# Patient Record
Sex: MALE | Employment: OTHER | URBAN - METROPOLITAN AREA
[De-identification: names, ages, dates, MRNs, and addresses within clinical notes are randomized per-mention and may not be internally consistent; named-entity substitution may affect disease eponyms.]

---

## 2019-09-04 ENCOUNTER — TELEPHONE (OUTPATIENT)
Dept: NEUROLOGY | Age: 69
End: 2019-09-04

## 2019-09-04 ENCOUNTER — OFFICE VISIT (OUTPATIENT)
Dept: NEUROLOGY | Age: 69
End: 2019-09-04

## 2019-09-04 VITALS
DIASTOLIC BLOOD PRESSURE: 80 MMHG | HEART RATE: 65 BPM | RESPIRATION RATE: 20 BRPM | BODY MASS INDEX: 28.3 KG/M2 | HEIGHT: 73 IN | OXYGEN SATURATION: 98 % | WEIGHT: 213.5 LBS | SYSTOLIC BLOOD PRESSURE: 144 MMHG

## 2019-09-04 DIAGNOSIS — G62.9 NEUROPATHY: ICD-10-CM

## 2019-09-04 DIAGNOSIS — G62.9 NEUROPATHY: Primary | ICD-10-CM

## 2019-09-04 DIAGNOSIS — G45.9 TIA (TRANSIENT ISCHEMIC ATTACK): ICD-10-CM

## 2019-09-04 NOTE — PROGRESS NOTES
This was an elective carotid Doppler for evaluation of possible TIA expression. B-mode or real-time imaging disclosed homogeneous plaquing of minimal nature in both the right and left ICA CCA distributions. Color-flow making subtle flow alterations regarding same. Doppler and spectral analysis otherwise unrevealing. No significant plaquing within the realms of the right or left ECA territories. Vertebral artery flow antegrade bilaterally. Impression: This is a minimally abnormal carotid Doppler indicating very subtle plaquing within the confines of the right and left ICA/CCA distributions as submitted. This does not appear to be of hemodynamic significance. Clinical correlation is advised.   FRANCHESCA TINOCO.

## 2019-09-04 NOTE — LETTER
9/4/19 Patient: Kelley Gallardo YOB: 1950 Date of Visit: 9/4/2019 Christa Pires MD 
[address] [address] VIA Facsimile: [phone] Dear Christa Pires MD, Thank you for referring Mr. Kelley Gallardo to [facility] for evaluation. My notes for this consultation are attached. If you have questions, please do not hesitate to call me. I look forward to following your patient along with you.  
 
 
Sincerely, 
 
Melissa Lemons MD

## 2019-09-04 NOTE — PATIENT INSTRUCTIONS
Learning About Chase Solorzano  What is a living will? A living will is a legal form you use to write down the kind of care you want at the end of your life. It is used by the health professionals who will treat you if you aren't able to decide for yourself. If you put your wishes in writing, your loved ones and others will know what kind of care you want. They won't need to guess. This can ease your mind and be helpful to others. A living will is not the same as an estate or property will. An estate will explains what you want to happen with your money and property after you die. Is a living will a legal document? A living will is a legal document. Each state has its own laws about living sadler. If you move to another state, make sure that your living will is legal in the state where you now live. Or you might use a universal form that has been approved by many states. This kind of form can sometimes be completed and stored online. Your electronic copy will then be available wherever you have a connection to the Internet. In most cases, doctors will respect your wishes even if you have a form from a different state. · You don't need an  to complete a living will. But legal advice can be helpful if your state's laws are unclear, your health history is complicated, or your family can't agree on what should be in your living will. · You can change your living will at any time. Some people find that their wishes about end-of-life care change as their health changes. · In addition to making a living will, think about completing a medical power of  form. This form lets you name the person you want to make end-of-life treatment decisions for you (your \"health care agent\") if you're not able to. Many hospitals and nursing homes will give you the forms you need to complete a living will and a medical power of .   · Your living will is used only if you can't make or communicate decisions for yourself anymore. If you become able to make decisions again, you can accept or refuse any treatment, no matter what you wrote in your living will. · Your state may offer an online registry. This is a place where you can store your living will online so the doctors and nurses who need to treat you can find it right away. What should you think about when creating a living will? Talk about your end-of-life wishes with your family members and your doctor. Let them know what you want. That way the people making decisions for you won't be surprised by your choices. Think about these questions as you make your living will:  · Do you know enough about life support methods that might be used? If not, talk to your doctor so you know what might be done if you can't breathe on your own, your heart stops, or you're unable to swallow. · What things would you still want to be able to do after you receive life-support methods? Would you want to be able to walk? To speak? To eat on your own? To live without the help of machines? · If you have a choice, where do you want to be cared for? In your home? At a hospital or nursing home? · Do you want certain Anabaptist practices performed if you become very ill? · If you have a choice at the end of your life, where would you prefer to die? At home? In a hospital or nursing home? Somewhere else? · Would you prefer to be buried or cremated? · Do you want your organs to be donated after you die? What should you do with your living will? · Make sure that your family members and your health care agent have copies of your living will. · Give your doctor a copy of your living will to keep in your medical record. If you have more than one doctor, make sure that each one has a copy. · You may want to put a copy of your living will where it can be easily found. Where can you learn more? Go to http://gonzalo-tony.info/.   Enter Q613 in the search box to learn more about \"Learning About Living Perrohank. \"  Current as of: April 1, 2019  Content Version: 12.1  © 0946-9354 [employer]. Care instructions adapted under license by [employer] (which disclaims liability or warranty for this information). If you have questions about a medical condition or this instruction, always ask your healthcare professional. [place] [address] any warranty or liability for your use of this information. Patient history reviewed and patient examined. Patient with far reaching symptoms that extend over multiple areas of concern and not simply consolidated. For that reason will suggest a variety of investigative test including lab work carotid Doppler imaging of the head with an MRI MRA and an EMG nerve conduction of the lower extremities. Further suggestions could follow.

## 2019-09-04 NOTE — PROGRESS NOTES
Neurology Consult      Subjective:      Colleen Mcleod is a 77 y.o. male Who comes in today with his spouse. Has an interesting 1 year history of transient episodes of tingling and a stereotypical distribution typically both sides of the face the mouth for tongue and both hands. There is no weakness component. No associated headache component. This happens every a.m. can last hours and can occur later in the day as well lasting less than an hour? It is enhanced by stress and diminished but really nothing. Says his special sensory function hearing smell taste and vision is okay although he is seeing a retinal specialist for left eye difficulties. I think he has had a cataract removal left eye? Also admits that he can notice some exceptional distortions of sensibility in the feet. Again no pain. No background history of diabetes no previously recognized stroke or TIA history. No background history of hypertension cholesterol  heart disease does not smoke ethanol ingestion is mild exceptionally moderate. Bowel and bladder function okay history of previous prostatectomy for cancer but no evidence of recurrence. Bulbar function intact. Current Outpatient Medications   Medication Sig Dispense Refill    buPROPion XL (WELLBUTRIN XL) 150 mg tablet Take 150 mg by mouth every morning.  therapeutic multivitamin (THERAGRAN) tablet Take 1 Tab by mouth daily.  cholecalciferol (VITAMIN D3) 1,000 unit tablet Take 1,000 Units by mouth daily.  docusate sodium (COLACE) 100 mg capsule Take 1 Cap by mouth two (2) times a day. 60 Cap 2    oxybutynin (DITROPAN) 5 mg tablet Take 1 Tab by mouth every eight (8) hours as needed for Other (Bladder Spasm). 30 Tab 1    oxyCODONE IR (ROXICODONE) 5 mg immediate release tablet Take 1 Tab by mouth every four (4) hours as needed for Pain.  Max Daily Amount: 30 mg. 30 Tab 0    dextroamphetamine-amphetamine (ADDERALL) 20 mg tablet Take 20 mg by mouth two (2) times a day.      omega-3 fatty acids-vitamin e (FISH OIL) 1,000 mg cap Take 1 Cap by mouth daily. Allergies   Allergen Reactions    Celebrex [Celecoxib] Nausea Only    Sulfa (Sulfonamide Antibiotics) Swelling     Skin redness with oral sulfa  Topical sulfa causes swelling at the site     Past Medical History:   Diagnosis Date    Arthritis     hands    Cancer ([facility] [address]     prostate cancer    Depression     History of chicken pox     History of measles     History of rheumatic fever as a child     Ill-defined condition 1-    diviticulitis- treated with antibiotics- pain currently resolved    Ill-defined condition     hypoglycemic    Ill-defined condition     vitamin d deficiency    Ill-defined condition     Leiden Factor V- no hx of blood clots    Psychiatric disorder 2004    depression    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    Snoring       Past Surgical History:   Procedure Laterality Date    HX APPENDECTOMY      HX HERNIA REPAIR      HX ORTHOPAEDIC  2007    right knee replacement    HX ORTHOPAEDIC  2001    left knee surgery    HX OTHER SURGICAL  1998    bilateral hernia repair    HX PROSTATECTOMY  2013    HX SHOULDER ARTHROSCOPY      HX TONSILLECTOMY        Social History     Socioeconomic History    Marital status: UNKNOWN     Spouse name: Not on file    Number of children: Not on file    Years of education: Not on file    Highest education level: Not on file   Occupational History    Not on file   Social Needs    Financial resource strain: Not on file    Food insecurity:     Worry: Not on file     Inability: Not on file    Transportation needs:     Medical: Not on file     Non-medical: Not on file   Tobacco Use    Smoking status: Never Smoker    Smokeless tobacco: Never Used   Substance and Sexual Activity    Alcohol use:  Yes     Alcohol/week: 11.0 standard drinks     Types: 5 Glasses of wine, 6 Cans of beer per week    Drug use: No    Sexual activity: Yes Partners: Female     Birth control/protection: None   Lifestyle    Physical activity:     Days per week: Not on file     Minutes per session: Not on file    Stress: Not on file   Relationships    Social connections:     Talks on phone: Not on file     Gets together: Not on file     Attends Roman Catholic service: Not on file     Active member of club or organization: Not on file     Attends meetings of clubs or organizations: Not on file     Relationship status: Not on file    Intimate partner violence:     Fear of current or ex partner: Not on file     Emotionally abused: Not on file     Physically abused: Not on file     Forced sexual activity: Not on file   Other Topics Concern    Not on file   Social History Narrative    Not on file      Family History   Problem Relation Age of Onset    Cancer Father         prostate and esophageal cancer    Lung Disease Mother     Diabetes Maternal Grandfather     Cancer Sister         breast    Headache Sister       Visit Vitals  /80   Pulse 65   Resp 20   Ht 6' 1\" (1.854 m)   Wt 96.8 kg (213 lb 8 oz)   SpO2 98%   BMI 28.17 kg/m²        Review of Systems:   A comprehensive review of systems was negative except for that written in the HPI. Neuro Exam:     Appearance: The patient is well developed, well nourished, provides a coherent history and is in no acute distress. Mental Status: Oriented to time, place and person. Mood and affect appropriate. Cranial Nerves:   Intact visual fields. Fundi are benign. ALTHEA, EOM's full, no nystagmus, no ptosis. Facial sensation is normal. Corneal reflexes are intact. Facial movement is symmetric. Hearing is normal bilaterally. Palate is midline with normal sternocleidomastoid and trapezius muscles are normal. Tongue is midline. Motor:  5/5 strength in upper and lower proximal and distal muscles. Normal bulk and tone. No fasciculations.    Reflexes:   Deep tendon reflexes 2+/4 and symmetrical.   Sensory:    Diminished distally to touch, pinprick and vibration. DSS ok and position ok. Gait:  Normal gait. Tremor:   No tremor noted. Cerebellar:  No cerebellar signs present. Neurovascular:  Normal heart sounds and regular rhythm, peripheral pulses intact, and no carotid bruits. Toes downgoing no clonus no Veronica's. Lhermitte's negative. Straight leg raising -90 degrees. Tinel's over both wrist ulnar grooves negative Adson's maneuver negative Phalen's negative. .            Assessment:   Problem 1 TIA with special consideration for VBI. Interesting episodic bilateral sensory expressions on a daily basis and would like to work-up with carotid Doppler MRI brain and MRA of brain. We will also suggest an echocardiogram. Initial suggestion to take daily baby ASA on first encounter history etc... Problem 2 neuropathy. Patient has symptoms beyond the head and neck area and by exam findings suggestive of neuropathy on first encounter. Check labs do EMG nerve conduction both lower extremities and further suggestions may follow. Plan:   Revisit in about 6 weeks.   Signed by :  Ignacia Muhammad MD

## 2019-09-05 LAB
ALBUMIN SERPL ELPH-MCNC: 3.9 G/DL (ref 2.9–4.4)
ALBUMIN SERPL-MCNC: 4.5 G/DL (ref 3.6–4.8)
ALBUMIN/GLOB SERPL: 1.3 {RATIO} (ref 0.7–1.7)
ALBUMIN/GLOB SERPL: 1.9 {RATIO} (ref 1.2–2.2)
ALP SERPL-CCNC: 83 IU/L (ref 39–117)
ALPHA1 GLOB SERPL ELPH-MCNC: 0.2 G/DL (ref 0–0.4)
ALPHA2 GLOB SERPL ELPH-MCNC: 0.7 G/DL (ref 0.4–1)
ALT SERPL-CCNC: 20 IU/L (ref 0–44)
ANA SER QL: NEGATIVE
AST SERPL-CCNC: 20 IU/L (ref 0–40)
B-GLOBULIN SERPL ELPH-MCNC: 1.1 G/DL (ref 0.7–1.3)
BASOPHILS # BLD AUTO: 0.1 X10E3/UL (ref 0–0.2)
BASOPHILS NFR BLD AUTO: 1 %
BILIRUB SERPL-MCNC: 0.3 MG/DL (ref 0–1.2)
BUN SERPL-MCNC: 16 MG/DL (ref 8–27)
BUN/CREAT SERPL: 14 (ref 10–24)
CALCIUM SERPL-MCNC: 9.7 MG/DL (ref 8.6–10.2)
CHLORIDE SERPL-SCNC: 103 MMOL/L (ref 96–106)
CO2 SERPL-SCNC: 23 MMOL/L (ref 20–29)
CREAT SERPL-MCNC: 1.13 MG/DL (ref 0.76–1.27)
CRP SERPL-MCNC: <1 MG/L (ref 0–10)
ENA SS-A AB SER-ACNC: <0.2 AI (ref 0–0.9)
ENA SS-B AB SER-ACNC: <0.2 AI (ref 0–0.9)
EOSINOPHIL # BLD AUTO: 0.2 X10E3/UL (ref 0–0.4)
EOSINOPHIL NFR BLD AUTO: 4 %
ERYTHROCYTE [DISTWIDTH] IN BLOOD BY AUTOMATED COUNT: 12 % (ref 12.3–15.4)
ERYTHROCYTE [SEDIMENTATION RATE] IN BLOOD BY WESTERGREN METHOD: 2 MM/HR (ref 0–30)
FOLATE SERPL-MCNC: 14.7 NG/ML
GAMMA GLOB SERPL ELPH-MCNC: 1.1 G/DL (ref 0.4–1.8)
GLOBULIN SER CALC-MCNC: 2.4 G/DL (ref 1.5–4.5)
GLOBULIN SER CALC-MCNC: 3 G/DL (ref 2.2–3.9)
GLUCOSE SERPL-MCNC: 89 MG/DL (ref 65–99)
HCT VFR BLD AUTO: 45.5 % (ref 37.5–51)
HGB BLD-MCNC: 15.2 G/DL (ref 13–17.7)
IMM GRANULOCYTES # BLD AUTO: 0 X10E3/UL (ref 0–0.1)
IMM GRANULOCYTES NFR BLD AUTO: 1 %
LYMPHOCYTES # BLD AUTO: 1.9 X10E3/UL (ref 0.7–3.1)
LYMPHOCYTES NFR BLD AUTO: 29 %
M PROTEIN SERPL ELPH-MCNC: NORMAL G/DL
MCH RBC QN AUTO: 29.7 PG (ref 26.6–33)
MCHC RBC AUTO-ENTMCNC: 33.4 G/DL (ref 31.5–35.7)
MCV RBC AUTO: 89 FL (ref 79–97)
MONOCYTES # BLD AUTO: 0.6 X10E3/UL (ref 0.1–0.9)
MONOCYTES NFR BLD AUTO: 10 %
NEUTROPHILS # BLD AUTO: 3.7 X10E3/UL (ref 1.4–7)
NEUTROPHILS NFR BLD AUTO: 55 %
PLATELET # BLD AUTO: 287 X10E3/UL (ref 150–450)
PLEASE NOTE, 011150: NORMAL
POTASSIUM SERPL-SCNC: 4.5 MMOL/L (ref 3.5–5.2)
PROT SERPL-MCNC: 6.9 G/DL (ref 6–8.5)
RBC # BLD AUTO: 5.11 X10E6/UL (ref 4.14–5.8)
RHEUMATOID FACT SERPL-ACNC: <10 IU/ML (ref 0–13.9)
SODIUM SERPL-SCNC: 139 MMOL/L (ref 134–144)
TSH SERPL DL<=0.005 MIU/L-ACNC: 4.08 UIU/ML (ref 0.45–4.5)
VIT B12 SERPL-MCNC: 448 PG/ML (ref 232–1245)
WBC # BLD AUTO: 6.6 X10E3/UL (ref 3.4–10.8)

## 2019-09-06 ENCOUNTER — HOSPITAL ENCOUNTER (OUTPATIENT)
Dept: MRI IMAGING | Age: 69
Discharge: HOME | End: 2019-09-06
Attending: SPECIALIST
Payer: MEDICARE

## 2019-09-06 ENCOUNTER — HOSPITAL ENCOUNTER (OUTPATIENT)
Dept: NON INVASIVE DIAGNOSTICS | Age: 69
Discharge: HOME | End: 2019-09-06
Attending: SPECIALIST
Payer: MEDICARE

## 2019-09-06 DIAGNOSIS — G45.9 TIA (TRANSIENT ISCHEMIC ATTACK): ICD-10-CM

## 2019-09-06 LAB
ECHO AO ROOT DIAM: 3.05 CM
ECHO AV AREA PLAN: 3.9 CM2
ECHO EST RA PRESSURE: 5 MMHG
ECHO LA AREA 4C: 18.8 CM2
ECHO LA MAJOR AXIS: 3.43 CM
ECHO LA TO AORTIC ROOT RATIO: 1.12
ECHO LA VOL 4C: 51.93 ML (ref 18–58)
ECHO LV EDV A4C: 100.3 ML
ECHO LV EJECTION FRACTION A4C: 67 %
ECHO LV ESV A4C: 33.4 ML
ECHO LV INTERNAL DIMENSION DIASTOLIC: 4.18 CM (ref 4.2–5.9)
ECHO LV INTERNAL DIMENSION SYSTOLIC: 2.84 CM
ECHO LV IVSD: 1.34 CM (ref 0.6–1)
ECHO LV MASS 2D: 203.5 G (ref 88–224)
ECHO LV MASS INDEX 2D: 78.1 G/M2 (ref 49–115)
ECHO LV POSTERIOR WALL DIASTOLIC: 1.03 CM (ref 0.6–1)
ECHO LVOT DIAM: 1.7 CM
ECHO LVOT PEAK GRADIENT: 4.8 MMHG
ECHO LVOT PEAK VELOCITY: 110.05 CM/S
ECHO MV A VELOCITY: 56.72 CM/S
ECHO MV AREA PHT: 3.8 CM2
ECHO MV AREA PLAN: 8.3 CM2
ECHO MV E DECELERATION TIME (DT): 102.1 MS
ECHO MV E VELOCITY: 28.76 CM/S
ECHO MV E/A RATIO: 0.5
ECHO MV MAX VELOCITY: 68.32 CM/S
ECHO MV MEAN GRADIENT: 0.6 MMHG
ECHO MV PEAK GRADIENT: 1.9 MMHG
ECHO MV PRESSURE HALF TIME (PHT): 58.3 MS
ECHO MV VTI: 16.29 CM
ECHO PULMONARY ARTERY SYSTOLIC PRESSURE (PASP): 18.1 MMHG
ECHO PV REGURGITANT MAX VELOCITY: 84.74 CM/S
ECHO RA AREA 4C: 10.86 CM2
ECHO RIGHT VENTRICULAR SYSTOLIC PRESSURE (RVSP): 18.1 MMHG
ECHO TV REGURGITANT MAX VELOCITY: 181.02 CM/S
ECHO TV REGURGITANT PEAK GRADIENT: 13.1 MMHG

## 2019-09-06 PROCEDURE — 70551 MRI BRAIN STEM W/O DYE: CPT

## 2019-09-06 PROCEDURE — 70544 MR ANGIOGRAPHY HEAD W/O DYE: CPT

## 2019-09-06 PROCEDURE — 93306 TTE W/DOPPLER COMPLETE: CPT

## 2019-09-18 DIAGNOSIS — G62.9 NEUROPATHY: ICD-10-CM

## 2019-10-01 ENCOUNTER — OFFICE VISIT (OUTPATIENT)
Dept: NEUROLOGY | Age: 69
End: 2019-10-01

## 2019-10-01 DIAGNOSIS — G62.9 PERIPHERAL NERVE DISORDER: Primary | ICD-10-CM

## 2019-10-01 NOTE — PROGRESS NOTES
This was an elective EMG nerve conduction of both lower extremities. Concerns went to an expressed peripheral neuropathy. Patient history. Patient had several chief complaints on first encounter 1 of which was altered sensation in the feet. Recently had labs and that was a mild deficiency and vitamin B12. Currently taking 500 mcg B12 supplements. No history of diabetes. No connective tissue disease history. Patient exam.  Alert cooperative articulate and appropriate. Cranial nerves II through XII normal.  Cerebellar testing finger-nose-finger toe to finger intact. Motor 5/5. Sensory reveals slightly diminished appreciation to touch and temperature etc. in the legs. Gait and transfers normal.    EMG nerve conduction findings. 1.  Needle insertion and probing was uniformly normal.  No evidence of acute denervation or chronic denervation/reinnervation or myopathic potentials. Motor unit recruitment as to number morphology and time sequencing all appropriate. Patient tolerated this without difficulties. 2.  The nerve conduction portion suggest unobtainable right superior peroneal sensory and both left and right sural sensory's. There was only a subtle delay in the right left tibial F-wave latencies. H reflex is normal.    Impression: This study suggests at first look, a sensory neuropathy with results as noted. I do not see a simple explanation based on patient's age, as he is very physically fit and otherwise attentive to his health needs.   FRANCHESCA TINOCO.

## 2019-10-01 NOTE — PROGRESS NOTES
EMG/ NCS Report  [facility]  [address] [facility], [address] [place] [address] [address]   Ph: [phone]   FAX: [phone]/ [phone]  Test Date:  10/1/2019    Patient: Ana Hairston : 1950 Physician: Ascencion Quintana MD   Sex: Male Height: ' \" Ref Phys: Reinier Silva IV, MD   ID#: [id number] Weight:  lbs. Technician: [facility]     Patient History / Exam:  CC:PERIPHERIAL NEUROPATHY          EMG & NCV Findings:  Evaluation of the left Fibular motor and the right Fibular motor nerves showed normal distal onset latency (L6.4, R4.5 ms), normal amplitude (L2.9, R6.2 mV), normal conduction velocity (B Fib-Ankle, L46, R42 m/s), and normal conduction velocity (Poplt-B Fib, L45, R43 m/s). The left tibial motor and the right tibial motor nerves showed normal distal onset latency (L4.1, R4.7 ms), normal amplitude (L4.9, R6.5 mV), and normal conduction velocity (Knee-Ankle, L41, R41 m/s). The left Sup Fibular sensory nerve showed normal distal peak latency (2.7 ms), normal amplitude (8.4 µV), and normal conduction velocity (Lower leg-Lat ankle, 48 m/s). The right Sup Fibular sensory nerve showed no response (Lower leg). The left sural sensory and the right sural sensory nerves showed no response (Calf). F Wave studies indicate that the left tibial F wave has prolonged latency (57.82 ms). The right tibial F wave has prolonged latency (57.82 ms). All F Wave left vs. right side latency differences were within normal limits. All H Reflex left vs. right side latency differences were within normal limits. All examined muscles (as indicated in the following table) showed no evidence of electrical instability.         Impression:        ___________________________  Reinier Silva IV, MD      Nerve Conduction Studies  Anti Sensory Summary Table     Stim Site NR Peak (ms) Norm Peak (ms) P-T Amp (µV) Norm P-T Amp Site1 Site2 Dist (cm)   Left Sup Fibular Anti Sensory (Lat ankle)  28.1°C   Lower leg    2.7 <4.6 8.4 >4 Lower leg Lat ankle 10.0   Site 2    2.6  7.0       Right Sup Fibular Anti Sensory (Lat ankle)  30.1°C   Lower leg NR  <4.6  >4 Lower leg Lat ankle 10.0   Left Sural Anti Sensory (Lat Mall)  28.6°C   Calf NR  <4.5  >4.0 Calf Lat Mall 14.0   Right Sural Anti Sensory (Lat Mall)  30.5°C   Calf NR  <4.5  >4.0 Calf Lat Mall 14.0     Motor Summary Table     Stim Site NR Onset (ms) Norm Onset (ms) O-P Amp (mV) Norm O-P Amp Amp (Prev) (%) Site1 Site2 Dist (cm) Mohinder (m/s) Norm Mohinder (m/s)   Left Fibular Motor (Ext Dig Brev)  29.8°C   Ankle    6.4 <6.5 2.9 >1.1 100.0 Ankle Ext Dig Brev 8.0     B Fib    13.4  2.5  86.2 B Fib Ankle 32.0 46 >38   Poplt    15.6  2.3  92.0 Poplt B Fib 10.0 45 >42   Right Fibular Motor (Ext Dig Brev)  29.7°C   Ankle    4.5 <6.5 6.2 >1.1 100.0 Ankle Ext Dig Brev 8.0     B Fib    12.3  4.8  77.4 B Fib Ankle 33.0 42 >38   Poplt    14.6  4.5  93.8 Poplt B Fib 10.0 43 >42   Left Tibial Motor (Abd Flores Brev)  28.7°C   Ankle    4.1 <6.1 4.9 >1.1 100.0 Ankle Abd Flores Brev 8.0     Knee    13.3  3.9  79.6 Knee Ankle 38.0 41 >39   Right Tibial Motor (Abd Flores Brev)  30.4°C   Ankle    4.7 <6.1 6.5 >1.1 100.0 Ankle Abd Flores Brev 8.0     Knee    14.3  5.3  81.5 Knee Ankle 39.0 41 >39     F Wave Studies     NR F-Lat (ms) Lat Norm (ms) L-R F-Lat (ms) L-R Lat Norm   Left Tibial (Mrkrs) (Abd Hallucis)  28.4°C      57.82 <56 0.00 <5.7   Right Tibial (Mrkrs) (Abd Hallucis)  29.9°C      57.82 <56 0.00 <5.7     H Reflex Studies     NR H-Lat (ms) L-R H-Lat (ms) L-R Lat Norm   Left Tibial (Gastroc)  28.3°C      35.02 0.00 <2.0   Right Tibial (Gastroc)  29.8°C      35.02 0.00 <2.0     EMG     Side Muscle Nerve Root Ins Act Fibs Psw Recrt Duration Amp Poly Comment   Right Ext Dig Brev Dp Br Peron L5, S1 Nml Nml Nml Nml Nml Nml Nml    Right AntTibialis Dp Br Peron L4-5 Nml Nml Nml Nml Nml Nml Nml    Right MedGastroc Tibial S1-2 Nml Nml Nml Nml Nml Nml Nml    Right VastusMed Femoral L2-4 Nml Nml Nml Nml Nml Nml Nml    Right BicepsFemL Sciatic L5-S2 Nml Nml Nml Nml Nml Nml Nml    Left Ext Dig Brev Dp Br Peron L5, S1 Nml Nml Nml Nml Nml Nml Nml    Left AntTibialis Dp Br Peron L4-5 Nml Nml Nml Nml Nml Nml Nml    Left MedGastroc Tibial S1-2 Nml Nml Nml Nml Nml Nml Nml    Left VastusMed Femoral L2-4 Nml Nml Nml Nml Nml Nml Nml    Left BicepsFemL Sciatic L5-S2 Nml Nml Nml Nml Nml Nml Nml                Nerve Conduction Studies  Anti Sensory Left/Right Comparison     Stim Site L Lat (ms) R Lat (ms) L-R Lat (ms) L Amp (µV) R Amp (µV) L-R Amp (%) Site1 Site2 L Mohinder (m/s) R Mohinder (m/s) L-R Mohinder (m/s)   Sup Fibular Anti Sensory (Lat ankle)  28.1°C   Lower leg 2.1   8.4   Lower leg Lat ankle 48     Site 2 2.2   7.0          Sural Anti Sensory (Lat Mall)  28.6°C   Calf       Calf Lat Mall        Motor Left/Right Comparison     Stim Site L Lat (ms) R Lat (ms) L-R Lat (ms) L Amp (mV) R Amp (mV) L-R Amp (%) Site1 Site2 L Mohinder (m/s) R Mohinder (m/s) L-R Mohinder (m/s)   Fibular Motor (Ext Dig Brev)  29.8°C   Ankle 6.4 4.5 1.9 2.9 6.2 53.2 Ankle Ext Dig Brev      B Fib 13.4 12.3 1.1 2.5 4.8 47.9 B Fib Ankle 46 42 4   Poplt 15.6 14.6 1.0 2.3 4.5 48.9 Poplt B Fib 45 43 2   Tibial Motor (Abd Folres Brev)  28.7°C   Ankle 4.1 4.7 0.6 4.9 6.5 24.6 Ankle Abd Flores Brev      Knee 13.3 14.3 1.0 3.9 5.3 26.4 Knee Ankle 41 41 0         Waveforms:

## 2019-10-03 ENCOUNTER — APPOINTMENT (OUTPATIENT)
Dept: GENERAL RADIOLOGY | Age: 69
End: 2019-10-03
Attending: PHYSICIAN ASSISTANT
Payer: MEDICARE

## 2019-10-03 ENCOUNTER — APPOINTMENT (OUTPATIENT)
Dept: VASCULAR SURGERY | Age: 69
End: 2019-10-03
Attending: PHYSICIAN ASSISTANT
Payer: MEDICARE

## 2019-10-03 ENCOUNTER — HOSPITAL ENCOUNTER (EMERGENCY)
Age: 69
Discharge: HOME | End: 2019-10-03
Attending: EMERGENCY MEDICINE
Payer: MEDICARE

## 2019-10-03 VITALS
OXYGEN SATURATION: 98 % | SYSTOLIC BLOOD PRESSURE: 159 MMHG | HEIGHT: 73 IN | BODY MASS INDEX: 27.17 KG/M2 | TEMPERATURE: 98.5 F | WEIGHT: 205 LBS | HEART RATE: 87 BPM | RESPIRATION RATE: 16 BRPM | DIASTOLIC BLOOD PRESSURE: 94 MMHG

## 2019-10-03 DIAGNOSIS — D68.51 FACTOR V LEIDEN MUTATION (HCC): ICD-10-CM

## 2019-10-03 DIAGNOSIS — M54.41 ACUTE BILATERAL LOW BACK PAIN WITH RIGHT-SIDED SCIATICA: Primary | ICD-10-CM

## 2019-10-03 DIAGNOSIS — M79.604 RIGHT LEG PAIN: ICD-10-CM

## 2019-10-03 PROCEDURE — 72100 X-RAY EXAM L-S SPINE 2/3 VWS: CPT

## 2019-10-03 PROCEDURE — 93971 EXTREMITY STUDY: CPT

## 2019-10-03 PROCEDURE — 74011250637 HC RX REV CODE- 250/637: Performed by: PHYSICIAN ASSISTANT

## 2019-10-03 PROCEDURE — 99283 EMERGENCY DEPT VISIT LOW MDM: CPT

## 2019-10-03 RX ORDER — METHOCARBAMOL 750 MG/1
750 TABLET, FILM COATED ORAL 4 TIMES DAILY
Qty: 20 TAB | Refills: 0 | Status: SHIPPED | OUTPATIENT
Start: 2019-10-03 | End: 2019-10-03

## 2019-10-03 RX ORDER — LIDOCAINE 4 G/100G
PATCH TOPICAL
Qty: 30 PATCH | Refills: 0 | Status: SHIPPED | OUTPATIENT
Start: 2019-10-03

## 2019-10-03 RX ORDER — NAPROXEN 250 MG/1
500 TABLET ORAL
Status: COMPLETED | OUTPATIENT
Start: 2019-10-03 | End: 2019-10-03

## 2019-10-03 RX ORDER — PREDNISONE 20 MG/1
60 TABLET ORAL DAILY
Qty: 15 TAB | Refills: 0 | Status: SHIPPED | OUTPATIENT
Start: 2019-10-03 | End: 2019-10-08

## 2019-10-03 RX ORDER — METHOCARBAMOL 750 MG/1
750 TABLET, FILM COATED ORAL 4 TIMES DAILY
Qty: 20 TAB | Refills: 0 | Status: SHIPPED | OUTPATIENT
Start: 2019-10-03 | End: 2021-05-11

## 2019-10-03 RX ADMIN — NAPROXEN 500 MG: 250 TABLET ORAL at 12:04

## 2019-10-03 NOTE — ED TRIAGE NOTES
Pt having soreness in left leg after traveling to and from [facility]. Pain in right leg. Pt sent here for US for blood clot.

## 2019-10-03 NOTE — ED PROVIDER NOTES
77 y.o. Male with PMHx significant for Factor V Leiden disorder, arthritis s/p right knee replacement, transient peripheral neuropathy, presents with chief complaint of right leg pain. Patient states that he recently got back from [place] (10+ hour plane ride) approximately one week ago. Several days after returning to the [facility] he felt the onset of pain in the right leg. Describes the discomfort as a \"soreness\" in the right lower extremity that extends all the way down the right lower extremity into the right foot. The pain progressively worsens as he goes about his day, and he rates his current level of discomfort in the ED as a 2/10 in severity. Patient additionally complains of discomfort in the bilateral buttocks, which started while he was in [facility]. There is some tingling in the right foot, but he denies loss of sensation or numbness. Patient has been taking Naproxen with some temporary relief to his discomfort, but he denies taking any Naproxen today. Patient denies history of spinal issues in the past, and he specifically denies nausea, vomiting, diarrhea, diaphoresis, abdominal pain, changes in bowel or bladder function. Of note, patient states that he has a history of right ankle sprain about five years ago and has had some residual swelling in the RLE since then. Denies any increase in swelling from baseline. There are no other acute medical concerns at this time. Social hx: Denies Tobacco use; Positive EtOH use (occasional); Denies Illicit Drug Abuse    PCP: Wei Talley MD     Note written by Jose A De La Torre, as dictated by EDITH Bridges 10:57 AM    The history is provided by the patient. No  was used.         Past Medical History:   Diagnosis Date    Arthritis     hands    Cancer [facility])     prostate cancer    Depression     History of chicken pox     History of measles     History of rheumatic fever as a child     Ill-defined condition 1- diviticulitis- treated with antibiotics- pain currently resolved    Ill-defined condition     hypoglycemic    Ill-defined condition     vitamin d deficiency    Ill-defined condition     Leiden Factor V- no hx of blood clots    Psychiatric disorder 2004    depression    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    Snoring        Past Surgical History:   Procedure Laterality Date    HX APPENDECTOMY      HX HERNIA REPAIR      HX ORTHOPAEDIC  2007    right knee replacement    HX ORTHOPAEDIC  2001    left knee surgery    HX OTHER SURGICAL  1998    bilateral hernia repair    HX PROSTATECTOMY  2013    HX SHOULDER ARTHROSCOPY      HX TONSILLECTOMY           Family History:   Problem Relation Age of Onset   Zada Sprain Cancer Father         prostate and esophageal cancer    Lung Disease Mother     Diabetes Maternal Grandfather     Cancer Sister         breast    Headache Sister        Social History     Socioeconomic History    Marital status:      Spouse name: Not on file    Number of children: Not on file    Years of education: Not on file    Highest education level: Not on file   Occupational History    Not on file   Social Needs    Financial resource strain: Not on file    Food insecurity:     Worry: Not on file     Inability: Not on file    Transportation needs:     Medical: Not on file     Non-medical: Not on file   Tobacco Use    Smoking status: Never Smoker    Smokeless tobacco: Never Used   Substance and Sexual Activity    Alcohol use:  Yes     Alcohol/week: 11.0 standard drinks     Types: 5 Glasses of wine, 6 Cans of beer per week    Drug use: No    Sexual activity: Yes     Partners: Female     Birth control/protection: None   Lifestyle    Physical activity:     Days per week: Not on file     Minutes per session: Not on file    Stress: Not on file   Relationships    Social connections:     Talks on phone: Not on file     Gets together: Not on file     Attends Anabaptism service: Not on file     Active member of club or organization: Not on file     Attends meetings of clubs or organizations: Not on file     Relationship status: Not on file    Intimate partner violence:     Fear of current or ex partner: Not on file     Emotionally abused: Not on file     Physically abused: Not on file     Forced sexual activity: Not on file   Other Topics Concern    Not on file   Social History Narrative    Not on file         ALLERGIES: Celebrex [celecoxib] and Sulfa (sulfonamide antibiotics)    Review of Systems   Constitutional: Negative for appetite change, chills, fatigue and fever. HENT: Negative for congestion, ear pain, postnasal drip, rhinorrhea and sore throat. Eyes: Negative for visual disturbance. Respiratory: Negative for cough, shortness of breath and wheezing. Cardiovascular: Negative for chest pain, palpitations and leg swelling. Gastrointestinal: Negative for abdominal pain, anal bleeding, constipation, diarrhea, nausea and vomiting. Genitourinary: Negative for dysuria and hematuria. Musculoskeletal: Positive for back pain (B/L buttocks) and myalgias (RLE). Negative for arthralgias. Skin: Negative for rash. Allergic/Immunologic: Negative for immunocompromised state. Neurological: Negative for weakness, light-headedness and headaches. Vitals:    10/03/19 1100 10/03/19 1156   BP: (!) 159/94    Pulse: 87    Resp: 16    Temp: 98.5 °F (36.9 °C)    SpO2: 98% 98%   Weight: 93 kg (205 lb)    Height: 6' 1\" (1.854 m)             Physical Exam   Constitutional: He is oriented to person, place, and time. He appears well-developed and well-nourished. No distress. HENT:   Head: Normocephalic and atraumatic. Right Ear: External ear normal.   Left Ear: External ear normal.   Eyes: Pupils are equal, round, and reactive to light. EOM are normal.   Neck: Neck supple. No JVD present. No tracheal deviation present.    Cardiovascular: Normal rate, regular rhythm, normal heart sounds and intact distal pulses. Exam reveals no gallop and no friction rub. No murmur heard. Pulmonary/Chest: Effort normal and breath sounds normal. No stridor. No respiratory distress. He has no wheezes. He has no rales. He exhibits no tenderness. Abdominal: Soft. Bowel sounds are normal. He exhibits no distension and no mass. There is no tenderness. There is no rebound and no guarding. No hernia. Musculoskeletal: Normal range of motion. He exhibits tenderness. He exhibits no edema or deformity. Back:  Diffuse mild lumbar TTP  to midline and throughout no swelling or step off.+ radiation to right leg. Neg swelling or homans. No discoloration. No deformity or lesions. Neg SLR neg EHL neg CAIN. Ambulates without assistance. Distal n/v intact. Cap refill brisk   Lymphadenopathy:     He has no cervical adenopathy. Neurological: He is alert and oriented to person, place, and time. No cranial nerve deficit. Coordination normal.   Skin: Skin is warm and dry. Capillary refill takes less than 2 seconds. No rash noted. No erythema. No pallor. Psychiatric: He has a normal mood and affect. His behavior is normal.   Nursing note and vitals reviewed. MDM  Number of Diagnoses or Management Options  Acute bilateral low back pain with right-sided sciatica:   Factor V Leiden mutation [facility]):   Right leg pain:      Amount and/or Complexity of Data Reviewed  Tests in the radiology section of CPT®: ordered and reviewed  Review and summarize past medical records: yes  Independent visualization of images, tracings, or specimens: yes    Patient Progress  Patient progress: stable         Procedures    PROGRESS NOTE:  12:00 PM  Ultrasound is negative for DVT. Awaiting XR lumbar spine. PROGRESS NOTE:   12:56 PM  Discussed results with patient. Nothing acute seen on imaging. Will treat for radiculopathy and discharge. Pt to follow with pcp/ ortho.  Return precautions given       LABORATORY TESTS:  No results found for this or any previous visit (from the past 12 hour(s)). IMAGING RESULTS:    Xr Spine Lumb 2 Or 3 V    Result Date: 10/3/2019  INDICATION: Back pain FINDINGS: AP, lateral, and coned-down lateral views of the lumbar spine demonstrate 5 lumbar type vertebral bodies. The alignment is normal. There is no evidence of acute fracture. There is facet arthropathy in the lower lumbar spine. The sacroiliac joints appear intact. No soft tissue abnormalities are seen. IMPRESSION: No evidence of lumbar spine fracture or malalignment. MEDICATIONS GIVEN:  Medications   naproxen (NAPROSYN) tablet 500 mg (500 mg Oral Given 10/3/19 1204)       IMPRESSION:  1. Acute bilateral low back pain with right-sided sciatica    2. Right leg pain    3. Factor V Leiden mutation ([facility]        PLAN:  1. Discharge Medication List as of 10/3/2019 12:57 PM      START taking these medications    Details   predniSONE (DELTASONE) 20 mg tablet Take 60 mg by mouth daily for 5 days. , Print, Disp-15 Tab, R-0      lidocaine 4 % patch Wear up to 3 patches for 12 hours, then remove all patches for 12 hours. , Print, Disp-30 Patch, R-0      methocarbamol (ROBAXIN) 750 mg tablet Take 1 Tab by mouth four (4) times daily. , Normal, Disp-20 Tab, R-0         CONTINUE these medications which have NOT CHANGED    Details   docusate sodium (COLACE) 100 mg capsule Take 1 Cap by mouth two (2) times a day., Print, Disp-60 Cap, R-2      oxybutynin (DITROPAN) 5 mg tablet Take 1 Tab by mouth every eight (8) hours as needed for Other (Bladder Spasm). , Print, Disp-30 Tab, R-1      oxyCODONE IR (ROXICODONE) 5 mg immediate release tablet Take 1 Tab by mouth every four (4) hours as needed for Pain.  Max Daily Amount: 30 mg., Print, Disp-30 Tab, R-0      buPROPion XL (WELLBUTRIN XL) 150 mg tablet Take 150 mg by mouth every morning., Historical Med      dextroamphetamine-amphetamine (ADDERALL) 20 mg tablet Take 20 mg by mouth two (2) times a day., Historical Med      therapeutic multivitamin (THERAGRAN) tablet Take 1 Tab by mouth daily. , Historical Med      cholecalciferol (VITAMIN D3) 1,000 unit tablet Take 1,000 Units by mouth daily. , Historical Med      omega-3 fatty acids-vitamin e (FISH OIL) 1,000 mg cap Take 1 Cap by mouth daily. , Historical Med           2. Follow-up Information     Follow up With Specialties Details Why Contact Info    Jovan Spence MD [facility] Schedule an appointment as soon as possible for a visit 2-4 days for recheck  [address] [place] [address]      Pete Timmons MD Orthopedic Surgery Schedule an appointment as soon as possible for a visit 2-4 days for recheck [address]  [address] [facility]  [phone]          Return to ED if worse         12:58 PM  Pt has been reexamined. Pt has no new complaints, changes or physical findings. Care plan outlined and precautions discussed. All available results were reviewed with pt. All medications were reviewed with pt. All of pt's questions and concerns were addressed. Pt agrees to F/U as instructed and agrees to return to ED upon further deterioration. Pt is ready to go home.   Shobha Cortez, PA

## 2019-10-03 NOTE — DISCHARGE INSTRUCTIONS
Rest ice to areas that hurt. Gentle stretches      Learning About Relief for Back Pain  What is back tension and strain? Back strain happens when you overstretch, or pull, a muscle in your back. You may hurt your back in an accident or when you exercise or lift something. Most back pain will get better with rest and time. You can take care of yourself at home to help your back heal.  What can you do first to relieve back pain? When you first feel back pain, try these steps:  · Walk. Take a short walk (10 to 20 minutes) on a level surface (no slopes, hills, or stairs) every 2 to 3 hours. Walk only distances you can manage without pain, especially leg pain. · Relax. Find a comfortable position for rest. Some people are comfortable on the floor or a medium-firm bed with a small pillow under their head and another under their knees. Some people prefer to lie on their side with a pillow between their knees. Don't stay in one position for too long. · Try heat or ice. Try using a heating pad on a low or medium setting, or take a warm shower, for 15 to 20 minutes every 2 to 3 hours. Or you can buy single-use heat wraps that last up to 8 hours. You can also try an ice pack for 10 to 15 minutes every 2 to 3 hours. You can use an ice pack or a bag of frozen vegetables wrapped in a thin towel. There is not strong evidence that either heat or ice will help, but you can try them to see if they help. You may also want to try switching between heat and cold. · Take pain medicine exactly as directed. ¨ If the doctor gave you a prescription medicine for pain, take it as prescribed. ¨ If you are not taking a prescription pain medicine, ask your doctor if you can take an over-the-counter medicine. What else can you do? · Stretch and exercise. Exercises that increase flexibility may relieve your pain and make it easier for your muscles to keep your spine in a good, neutral position. And don't forget to keep walking.   · Do self-massage. You can use self-massage to unwind after work or school or to energize yourself in the morning. You can easily massage your feet, hands, or neck. Self-massage works best if you are in comfortable clothes and are sitting or lying in a comfortable position. Use oil or lotion to massage bare skin. · Reduce stress. Back pain can lead to a vicious [place]: Distress about the pain tenses the muscles in your back, which in turn causes more pain. Learn how to relax your mind and your muscles to lower your stress. Where can you learn more? Go to http://gonzaloCurrent Mediatony.info/. Enter F204 in the search box to learn more about \"Learning About Relief for Back Pain. \"  Current as of: March 21, 2017  Content Version: 11.5  © 6741-1646 [employer]. Care instructions adapted under license by [employer] (which disclaims liability or warranty for this information). If you have questions about a medical condition or this instruction, always ask your healthcare professional. [place] [address] any warranty or liability for your use of this information. Patient Education        Sciatica: Exercises  Introduction  Here are some examples of typical rehabilitation exercises for your condition. Start each exercise slowly. Ease off the exercise if you start to have pain. Your doctor or physical therapist will tell you when you can start these exercises and which ones will work best for you. When you are not being active, find a comfortable position for rest. Some people are comfortable on the floor or a medium-firm bed with a small pillow under their head and another under their knees. Some people prefer to lie on their side with a pillow between their knees. Don't stay in one position for too long. Take short walks (10 to 20 minutes) every 2 to 3 hours. Avoid slopes, hills, and stairs until you feel better.  Walk only distances you can manage without pain, especially leg pain. How to do the exercises  Back stretches    1. Get down on your hands and knees on the floor. 2. Relax your head and allow it to droop. Round your back up toward the ceiling until you feel a nice stretch in your upper, middle, and lower back. Hold this stretch for as long as it feels comfortable, or about 15 to 30 seconds. 3. Return to the starting position with a flat back while you are on your hands and knees. 4. Let your back sway by pressing your stomach toward the floor. Lift your buttocks toward the ceiling. 5. Hold this position for 15 to 30 seconds. 6. Repeat 2 to 4 times. Follow-up care is a key part of your treatment and safety. Be sure to make and go to all appointments, and call your doctor if you are having problems. It's also a good idea to know your test results and keep a list of the medicines you take. Where can you learn more? Go to http://gonzalo-tony.info/. Enter C275 in the search box to learn more about \"Sciatica: Exercises. \"  Current as of: June 26, 2019  Content Version: 12.2  © 3922-8153 [employer]. Care instructions adapted under license by [employer] (which disclaims liability or warranty for this information). If you have questions about a medical condition or this instruction, always ask your healthcare professional. [address] any warranty or liability for your use of this information.

## 2019-10-11 ENCOUNTER — OFFICE VISIT (OUTPATIENT)
Dept: NEUROLOGY | Age: 69
End: 2019-10-11

## 2019-10-11 VITALS
HEIGHT: 73 IN | WEIGHT: 213.3 LBS | BODY MASS INDEX: 28.27 KG/M2 | RESPIRATION RATE: 18 BRPM | OXYGEN SATURATION: 98 % | DIASTOLIC BLOOD PRESSURE: 66 MMHG | HEART RATE: 88 BPM | SYSTOLIC BLOOD PRESSURE: 112 MMHG

## 2019-10-11 DIAGNOSIS — R29.818 TRANSIENT NEUROLOGICAL SYMPTOMS: Primary | ICD-10-CM

## 2019-10-11 RX ORDER — LANOLIN ALCOHOL/MO/W.PET/CERES
500 CREAM (GRAM) TOPICAL DAILY
COMMUNITY

## 2019-10-11 NOTE — PATIENT INSTRUCTIONS
Learning About [place]  What is a living will? A living will is a legal form you use to write down the kind of care you want at the end of your life. It is used by the health professionals who will treat you if you aren't able to decide for yourself. If you put your wishes in writing, your loved ones and others will know what kind of care you want. They won't need to guess. This can ease your mind and be helpful to others. A living will is not the same as an estate or property will. An estate will explains what you want to happen with your money and property after you die. Is a living will a legal document? A living will is a legal document. Each state has its own laws about living sadler. If you move to another state, make sure that your living will is legal in the state where you now live. Or you might use a universal form that has been approved by many states. This kind of form can sometimes be completed and stored online. Your electronic copy will then be available wherever you have a connection to the Internet. In most cases, doctors will respect your wishes even if you have a form from a different state. · You don't need an  to complete a living will. But legal advice can be helpful if your state's laws are unclear, your health history is complicated, or your family can't agree on what should be in your living will. · You can change your living will at any time. Some people find that their wishes about end-of-life care change as their health changes. · In addition to making a living will, think about completing a medical power of  form. This form lets you name the person you want to make end-of-life treatment decisions for you (your \"health care agent\") if you're not able to. Many hospitals and nursing homes will give you the forms you need to complete a living will and a medical power of .   · Your living will is used only if you can't make or communicate decisions for yourself anymore. If you become able to make decisions again, you can accept or refuse any treatment, no matter what you wrote in your living will. · Your state may offer an online registry. This is a place where you can store your living will online so the doctors and nurses who need to treat you can find it right away. What should you think about when creating a living will? Talk about your end-of-life wishes with your family members and your doctor. Let them know what you want. That way the people making decisions for you won't be surprised by your choices. Think about these questions as you make your living will:  · Do you know enough about life support methods that might be used? If not, talk to your doctor so you know what might be done if you can't breathe on your own, your heart stops, or you're unable to swallow. · What things would you still want to be able to do after you receive life-support methods? Would you want to be able to walk? To speak? To eat on your own? To live without the help of machines? · If you have a choice, where do you want to be cared for? In your home? At a hospital or nursing home? · Do you want certain Caodaism practices performed if you become very ill? · If you have a choice at the end of your life, where would you prefer to die? At home? In a hospital or nursing home? Somewhere else? · Would you prefer to be buried or cremated? · Do you want your organs to be donated after you die? What should you do with your living will? · Make sure that your family members and your health care agent have copies of your living will. · Give your doctor a copy of your living will to keep in your medical record. If you have more than one doctor, make sure that each one has a copy. · You may want to put a copy of your living will where it can be easily found. Where can you learn more? Go to http://gonzalo-tony.info/.   Enter T922 in the search box to learn more about \"Learning About Living [place]. \"  Current as of: April 1, 2019  Content Version: 12.2  © 0181-6126 [employer]. Care instructions adapted under license by [employer] (which disclaims liability or warranty for this information). If you have questions about a medical condition or this instruction, always ask your healthcare professional. [address] any warranty or liability for your use of this information. Patient history reviewed and patient examined. Testing looks good. We talked briefly about a glucose tolerance test but he is certainly welcome to take this up with his primary care doctor. Would like to hold off on testing for now and suggest a revisit if there is any trending of symptoms or new issues that come to light. Otherwise I do not favor testing for the sake of testing only. I think his very physical lifestyle speaks for itself.

## 2019-10-11 NOTE — LETTER
10/11/19 Patient: Abdias Steve YOB: 1950 Date of Visit: 10/11/2019 Kvng Pollard MD 
[address] [facility] [address] [place] [id number] VIA Facsimile: [phone] Dear Kvng Pollard MD, Thank you for referring Mr. Abdias Steve to [facility] for evaluation. My notes for this consultation are attached. If you have questions, please do not hesitate to call me. I look forward to following your patient along with you.  
 
 
Sincerely, 
 
Gayle Watson MD

## 2019-10-11 NOTE — PROGRESS NOTES
Transient neurologic symptoms. Neurology Consult      Subjective:      Luna Briceno is a 77 y.o. male Who comes in today on a follow-up visit to discuss results. The MRI of the brain looked good and there was commentary on some sinus congestion but nothing else of significance. The MRA of the brain was a normal report. The blood work looked good and the EMG and nerve conduction assessment showed a sensory neuropathy but cannot currently link it to a confident cause-and-effect relation. Patient continues to attend to his regular physical activity routine without compromise or performance. His exam looks good today and we very briefly talked about the possibility of a glucose tolerance test but suggested he take that up with his primary care doctor as to whether that makes any sense or not at this time. He did reference spontaneously some concerns about the testing covering such entities as ALS but did not see any confident or reasonable affiliations with any of the concerns. Unfortunately there are lots of tests neurologists can pursue. But professionally I hold myself to standards of a confident cause-and-effect relation, and do not think these other tests, although available, should be pursued in such a manner. Current Outpatient Medications   Medication Sig Dispense Refill    sildenafil citrate (VIAGRA PO) Take  by mouth.  cyanocobalamin (VITAMIN B12) 500 mcg tablet Take 500 mcg by mouth daily.  TURMERIC PO Take  by mouth.  buPROPion XL (WELLBUTRIN XL) 150 mg tablet Take 150 mg by mouth every morning.  therapeutic multivitamin (THERAGRAN) tablet Take 1 Tab by mouth daily.  cholecalciferol (VITAMIN D3) 1,000 unit tablet Take 1,000 Units by mouth daily.  lidocaine 4 % patch Wear up to 3 patches for 12 hours, then remove all patches for 12 hours. 30 Patch 0    methocarbamol (ROBAXIN) 750 mg tablet Take 1 Tab by mouth four (4) times daily.  20 Tab 0    docusate sodium (COLACE) 100 mg capsule Take 1 Cap by mouth two (2) times a day. 60 Cap 2    oxybutynin (DITROPAN) 5 mg tablet Take 1 Tab by mouth every eight (8) hours as needed for Other (Bladder Spasm). 30 Tab 1    oxyCODONE IR (ROXICODONE) 5 mg immediate release tablet Take 1 Tab by mouth every four (4) hours as needed for Pain. Max Daily Amount: 30 mg. 30 Tab 0    dextroamphetamine-amphetamine (ADDERALL) 20 mg tablet Take 20 mg by mouth two (2) times a day.  omega-3 fatty acids-vitamin e (FISH OIL) 1,000 mg cap Take 1 Cap by mouth daily.         Allergies   Allergen Reactions    Celebrex [Celecoxib] Nausea Only    Sulfa (Sulfonamide Antibiotics) Swelling     Skin redness with oral sulfa  Topical sulfa causes swelling at the site     Past Medical History:   Diagnosis Date    Arthritis     hands    Cancer ([facility] [address]     prostate cancer    Depression     History of chicken pox     History of measles     History of rheumatic fever as a child     Ill-defined condition 1-    diviticulitis- treated with antibiotics- pain currently resolved    Ill-defined condition     hypoglycemic    Ill-defined condition     vitamin d deficiency    Ill-defined condition     Leiden Factor V- no hx of blood clots    Psychiatric disorder 2004    depression    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    Snoring       Past Surgical History:   Procedure Laterality Date    HX APPENDECTOMY      HX HERNIA REPAIR      HX ORTHOPAEDIC  2007    right knee replacement    HX ORTHOPAEDIC  2001    left knee surgery    HX OTHER SURGICAL  1998    bilateral hernia repair    HX PROSTATECTOMY  2013    HX SHOULDER ARTHROSCOPY      HX TONSILLECTOMY        Social History     Socioeconomic History    Marital status:      Spouse name: Not on file    Number of children: Not on file    Years of education: Not on file    Highest education level: Not on file   Occupational History    Not on file   Social Needs    Financial resource strain: Not on file    Food insecurity:     Worry: Not on file     Inability: Not on file    Transportation needs:     Medical: Not on file     Non-medical: Not on file   Tobacco Use    Smoking status: Never Smoker    Smokeless tobacco: Never Used   Substance and Sexual Activity    Alcohol use: Yes     Alcohol/week: 11.0 standard drinks     Types: 5 Glasses of wine, 6 Cans of beer per week    Drug use: No    Sexual activity: Yes     Partners: Female     Birth control/protection: None   Lifestyle    Physical activity:     Days per week: Not on file     Minutes per session: Not on file    Stress: Not on file   Relationships    Social connections:     Talks on phone: Not on file     Gets together: Not on file     Attends Congregational service: Not on file     Active member of club or organization: Not on file     Attends meetings of clubs or organizations: Not on file     Relationship status: Not on file    Intimate partner violence:     Fear of current or ex partner: Not on file     Emotionally abused: Not on file     Physically abused: Not on file     Forced sexual activity: Not on file   Other Topics Concern    Not on file   Social History Narrative    Not on file      Family History   Problem Relation Age of Onset    Cancer Father         prostate and esophageal cancer    Lung Disease Mother     Diabetes Maternal Grandfather     Cancer Sister         breast    Headache Sister       Visit Vitals  /66   Pulse 88   Resp 18   Ht 6' 1\" (1.854 m)   Wt 96.8 kg (213 lb 4.8 oz)   SpO2 98%   BMI 28.14 kg/m²        Review of Systems:   A comprehensive review of systems was negative except for that written in the HPI. Neuro Exam:     Appearance: The patient is well developed, well nourished, provides a coherent history and is in no acute distress. Mental Status: Oriented to time, place and person. Mood and affect appropriate. Cranial Nerves:   Intact visual fields. Fundi are benign.  ALTHEA, EOM's full, no nystagmus, no ptosis. Facial sensation is normal. Corneal reflexes are intact. Facial movement is symmetric. Hearing is normal bilaterally. Palate is midline with normal sternocleidomastoid and trapezius muscles are normal. Tongue is midline. Motor:  5/5 strength in upper and lower proximal and distal muscles. Normal bulk and tone. No fasciculations. Reflexes:   Deep tendon reflexes 1-2+/4 and symmetrical.   Sensory:   Normal to touch, pinprick and vibration. Gait:  Normal gait. Romberg negative. Tremor:   No tremor noted. Cerebellar:  No cerebellar signs present. Neurovascular:  Normal heart sounds and regular rhythm, peripheral pulses intact, and no carotid bruits. Assessment:   Transient neurologic symptoms. Patient looks good again on exam.  Catia Wiggins over test results he cannot think of anything else to ask him to do although we did briefly discuss the merits of a glucose tolerance test.  He is certainly welcome to ask his primary care physician regarding the same. If there is any trending of symptoms he knows where to find me. Otherwise I do not feel comfortable getting additional testing without a specific cause-and-effect relationship in mind. Certainly there are second opinions that could be generated in the local neurology community. Plan:   Revisit as needed.   Signed by :  Keven Rosas MD

## 2020-11-27 ENCOUNTER — TRANSCRIBE ORDER (OUTPATIENT)
Dept: SCHEDULING | Age: 70
End: 2020-11-27

## 2020-11-27 DIAGNOSIS — M48.062 SPINAL STENOSIS, LUMBAR REGION, WITH NEUROGENIC CLAUDICATION: Primary | ICD-10-CM

## 2020-11-27 DIAGNOSIS — M43.16 SPONDYLOLISTHESIS OF LUMBAR REGION: ICD-10-CM

## 2020-12-01 ENCOUNTER — HOSPITAL ENCOUNTER (EMERGENCY)
Age: 70
Discharge: STILL ADMITTED OR ADMINISTRATIVE | End: 2020-12-01

## 2020-12-01 ENCOUNTER — HOSPITAL ENCOUNTER (OUTPATIENT)
Dept: MRI IMAGING | Age: 70
Discharge: HOME | End: 2020-12-01
Attending: ORTHOPAEDIC SURGERY
Payer: MEDICARE

## 2020-12-01 DIAGNOSIS — M48.062 SPINAL STENOSIS, LUMBAR REGION, WITH NEUROGENIC CLAUDICATION: ICD-10-CM

## 2020-12-01 DIAGNOSIS — M43.16 SPONDYLOLISTHESIS OF LUMBAR REGION: ICD-10-CM

## 2020-12-01 PROCEDURE — 72148 MRI LUMBAR SPINE W/O DYE: CPT

## 2021-04-30 ENCOUNTER — APPOINTMENT (OUTPATIENT)
Dept: CT IMAGING | Age: 71
End: 2021-04-30
Attending: NURSE PRACTITIONER
Payer: MEDICARE

## 2021-04-30 ENCOUNTER — HOSPITAL ENCOUNTER (EMERGENCY)
Age: 71
Discharge: HOME | End: 2021-04-30
Attending: EMERGENCY MEDICINE
Payer: MEDICARE

## 2021-04-30 ENCOUNTER — APPOINTMENT (OUTPATIENT)
Dept: VASCULAR SURGERY | Age: 71
End: 2021-04-30
Attending: NURSE PRACTITIONER
Payer: MEDICARE

## 2021-04-30 VITALS
HEART RATE: 86 BPM | TEMPERATURE: 98.5 F | OXYGEN SATURATION: 97 % | HEIGHT: 73 IN | WEIGHT: 205 LBS | SYSTOLIC BLOOD PRESSURE: 116 MMHG | BODY MASS INDEX: 27.17 KG/M2 | DIASTOLIC BLOOD PRESSURE: 83 MMHG | RESPIRATION RATE: 16 BRPM

## 2021-04-30 DIAGNOSIS — I82.4Y2 ACUTE DEEP VEIN THROMBOSIS (DVT) OF PROXIMAL VEIN OF LEFT LOWER EXTREMITY (HCC): Primary | ICD-10-CM

## 2021-04-30 DIAGNOSIS — I26.99 OTHER ACUTE PULMONARY EMBOLISM, UNSPECIFIED WHETHER ACUTE COR PULMONALE PRESENT (HCC): ICD-10-CM

## 2021-04-30 DIAGNOSIS — M25.562 ARTHRALGIA OF LEFT LOWER LEG: ICD-10-CM

## 2021-04-30 LAB
ANION GAP BLD CALC-SCNC: 17 MMOL/L (ref 10–20)
BASOPHILS # BLD: 0.1 K/UL (ref 0–0.1)
BASOPHILS NFR BLD: 1 % (ref 0–1)
BUN BLD-MCNC: 19 MG/DL (ref 9–20)
CA-I BLD-MCNC: 1.21 MMOL/L (ref 1.12–1.32)
CHLORIDE BLD-SCNC: 104 MMOL/L (ref 98–107)
CO2 BLD-SCNC: 26 MMOL/L (ref 21–32)
COMMENT, HOLDF: NORMAL
CREAT BLD-MCNC: 0.9 MG/DL (ref 0.6–1.3)
DIFFERENTIAL METHOD BLD: ABNORMAL
EOSINOPHIL # BLD: 0.6 K/UL (ref 0–0.4)
EOSINOPHIL NFR BLD: 6 % (ref 0–7)
ERYTHROCYTE [DISTWIDTH] IN BLOOD BY AUTOMATED COUNT: 12.1 % (ref 11.5–14.5)
GLUCOSE BLD-MCNC: 84 MG/DL (ref 65–100)
HCT VFR BLD AUTO: 42.4 % (ref 36.6–50.3)
HCT VFR BLD CALC: 41 % (ref 36.6–50.3)
HGB BLD-MCNC: 13.7 G/DL (ref 12.1–17)
IMM GRANULOCYTES # BLD AUTO: 0.1 K/UL (ref 0–0.04)
IMM GRANULOCYTES NFR BLD AUTO: 1 % (ref 0–0.5)
LYMPHOCYTES # BLD: 1.9 K/UL (ref 0.8–3.5)
LYMPHOCYTES NFR BLD: 19 % (ref 12–49)
MCH RBC QN AUTO: 30.9 PG (ref 26–34)
MCHC RBC AUTO-ENTMCNC: 32.3 G/DL (ref 30–36.5)
MCV RBC AUTO: 95.5 FL (ref 80–99)
MONOCYTES # BLD: 1.2 K/UL (ref 0–1)
MONOCYTES NFR BLD: 11 % (ref 5–13)
NEUTS SEG # BLD: 6.4 K/UL (ref 1.8–8)
NEUTS SEG NFR BLD: 62 % (ref 32–75)
NRBC # BLD: 0 K/UL (ref 0–0.01)
NRBC BLD-RTO: 0 PER 100 WBC
PLATELET # BLD AUTO: 243 K/UL (ref 150–400)
PMV BLD AUTO: 10.1 FL (ref 8.9–12.9)
POTASSIUM BLD-SCNC: 4 MMOL/L (ref 3.5–5.1)
RBC # BLD AUTO: 4.44 M/UL (ref 4.1–5.7)
SAMPLES BEING HELD,HOLD: NORMAL
SERVICE CMNT-IMP: NORMAL
SODIUM BLD-SCNC: 141 MMOL/L (ref 136–145)
WBC # BLD AUTO: 10.2 K/UL (ref 4.1–11.1)

## 2021-04-30 PROCEDURE — 99282 EMERGENCY DEPT VISIT SF MDM: CPT

## 2021-04-30 PROCEDURE — 85025 COMPLETE CBC W/AUTO DIFF WBC: CPT

## 2021-04-30 PROCEDURE — 80047 BASIC METABLC PNL IONIZED CA: CPT

## 2021-04-30 PROCEDURE — 74011000636 HC RX REV CODE- 636: Performed by: RADIOLOGY

## 2021-04-30 PROCEDURE — 71275 CT ANGIOGRAPHY CHEST: CPT

## 2021-04-30 PROCEDURE — 74011250637 HC RX REV CODE- 250/637: Performed by: NURSE PRACTITIONER

## 2021-04-30 PROCEDURE — 93971 EXTREMITY STUDY: CPT

## 2021-04-30 PROCEDURE — 36415 COLL VENOUS BLD VENIPUNCTURE: CPT

## 2021-04-30 RX ORDER — LORAZEPAM 1 MG/1
1 TABLET ORAL
Status: COMPLETED | OUTPATIENT
Start: 2021-04-30 | End: 2021-04-30

## 2021-04-30 RX ORDER — APIXABAN 5 MG (74)
KIT ORAL
Qty: 1 DOSE PACK | Refills: 0 | Status: SHIPPED | OUTPATIENT
Start: 2021-04-30 | End: 2021-06-24

## 2021-04-30 RX ADMIN — LORAZEPAM 1 MG: 1 TABLET ORAL at 18:25

## 2021-04-30 RX ADMIN — APIXABAN 10 MG: 5 TABLET, FILM COATED ORAL at 18:26

## 2021-04-30 RX ADMIN — IOPAMIDOL 80 ML: 755 INJECTION, SOLUTION INTRAVENOUS at 17:06

## 2021-04-30 NOTE — ED NOTES
NP and RN reviewed discharge instructions and options with patient; patient verbalized understanding. Pt. Ambulated to exit without difficulty and in no signs of acute distress. Patient was counseled on medications prescribed at discharge and will follow up as discussed.

## 2021-04-30 NOTE — PROGRESS NOTES
Left LE venous duplex completed. Final results to follow. Verbal preliminary report given to Lucia Sam.  JADON Duenas.

## 2021-04-30 NOTE — ED PROVIDER NOTES
This is a 71-year-old male who presents ambulatory to the emergency room with complaints of left lower extremity pain starting posterior knee down into his left calf. This started yesterday and has worsened today causing him to have a numb sensation in his left foot. Patient states he is having increased difficulty weightbearing on the left side secondary to pain. Patient states his pain is 2 out of 10 when he is just sitting but increases dramatically when he ambulates. Denies any chest pain, shortness of breath, dizziness, nausea or vomiting, fever or chills. Patient is factor V Leiden positive with no history of blood clots. Comes to the emergency room to rule out a left lower extremity DVT. There are no further complaints at this time.     Lin Rivero MD  Past Medical History:  No date: Arthritis      Comment:  hands  : [facility]      Comment:  prostate cancer  No date: Depression  No date: History of chicken pox  No date: History of measles  No date: History of rheumatic fever as a child  1-: Ill-defined condition      Comment:  diviticulitis- treated with antibiotics- pain currently                resolved  No date: Ill-defined condition      Comment:  hypoglycemic  No date: Ill-defined condition      Comment:  vitamin d deficiency  No date: Ill-defined condition      Comment:  Leiden Factor V- no hx of blood clots  2004: Psychiatric disorder      Comment:  depression  2004: Psychiatric disorder      Comment:  adult ADDH  No date: Snoring  Past Surgical History:  No date: HX APPENDECTOMY  No date: HX HERNIA REPAIR  2007: HX ORTHOPAEDIC      Comment:  right knee replacement  2001: HX ORTHOPAEDIC      Comment:  left knee surgery  1998: HX OTHER SURGICAL      Comment:  bilateral hernia repair  2013: HX PROSTATECTOMY  No date: HX SHOULDER ARTHROSCOPY  No date: HX TONSILLECTOMY             Past Medical History:   Diagnosis Date    Arthritis     hands    [facility] ([facility]     prostate cancer    Depression     History of chicken pox     History of measles     History of rheumatic fever as a child     Ill-defined condition 1-    diviticulitis- treated with antibiotics- pain currently resolved    Ill-defined condition     hypoglycemic    Ill-defined condition     vitamin d deficiency    Ill-defined condition     Leiden Factor V- no hx of blood clots    Psychiatric disorder 2004    depression    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    Snoring        Past Surgical History:   Procedure Laterality Date    HX APPENDECTOMY      HX HERNIA REPAIR      HX ORTHOPAEDIC  2007    right knee replacement    HX ORTHOPAEDIC  2001    left knee surgery    HX OTHER SURGICAL  1998    bilateral hernia repair    HX PROSTATECTOMY  2013    HX SHOULDER ARTHROSCOPY      HX TONSILLECTOMY           Family History:   Problem Relation Age of Onset   Noah Cancer Father         prostate and esophageal cancer    Lung Disease Mother     Diabetes Maternal Grandfather     Cancer Sister         breast    Headache Sister        Social History     Socioeconomic History    Marital status:      Spouse name: Not on file    Number of children: Not on file    Years of education: Not on file    Highest education level: Not on file   Occupational History    Not on file   Social Needs    Financial resource strain: Not on file    Food insecurity     Worry: Not on file     Inability: Not on file    Transportation needs     Medical: Not on file     Non-medical: Not on file   Tobacco Use    Smoking status: Never Smoker    Smokeless tobacco: Never Used   Substance and Sexual Activity    Alcohol use:  Yes     Alcohol/week: 11.0 standard drinks     Types: 5 Glasses of wine, 6 Cans of beer per week    Drug use: No    Sexual activity: Yes     Partners: Female     Birth control/protection: None   Lifestyle    Physical activity     Days per week: Not on file     Minutes per session: Not on file    Stress: Not on file   Relationships    Social connections     Talks on phone: Not on file     Gets together: Not on file     Attends Congregational service: Not on file     Active member of club or organization: Not on file     Attends meetings of clubs or organizations: Not on file     Relationship status: Not on file    Intimate partner violence     Fear of current or ex partner: Not on file     Emotionally abused: Not on file     Physically abused: Not on file     Forced sexual activity: Not on file   Other Topics Concern    Not on file   Social History Narrative    Not on file         ALLERGIES: Celebrex [celecoxib] and Sulfa (sulfonamide antibiotics)    Review of Systems   Constitutional: Negative for activity change, appetite change, chills, fatigue and fever. HENT: Negative for congestion, ear discharge, ear pain, sinus pressure, sinus pain, sore throat and trouble swallowing. Eyes: Negative for photophobia, pain, redness, itching and visual disturbance. Respiratory: Negative for chest tightness and shortness of breath. Cardiovascular: Negative for chest pain and palpitations. Gastrointestinal: Negative for abdominal distention, abdominal pain, nausea and vomiting. Endocrine: Negative. Genitourinary: Negative for difficulty urinating, frequency and urgency. Musculoskeletal: Positive for arthralgias (left lower extremity). Negative for back pain, neck pain and neck stiffness. Skin: Negative for color change, pallor, rash and wound. Allergic/Immunologic: Negative. Neurological: Negative for dizziness, syncope, weakness and headaches. Hematological: Does not bruise/bleed easily. Psychiatric/Behavioral: Negative for behavioral problems. The patient is not nervous/anxious. There were no vitals filed for this visit. Physical Exam  Vitals signs and nursing note reviewed. Constitutional:       General: He is not in acute distress. Appearance: Normal appearance.  He is well-developed. He is not ill-appearing. HENT:      Head: Normocephalic and atraumatic. Right Ear: External ear normal.      Left Ear: External ear normal.      Nose: Nose normal.      Mouth/Throat:      Mouth: Mucous membranes are moist.   Eyes:      General:         Right eye: No discharge. Left eye: No discharge. Conjunctiva/sclera: Conjunctivae normal.      Pupils: Pupils are equal, round, and reactive to light. Neck:      Musculoskeletal: Normal range of motion and neck supple. Vascular: No JVD. Trachea: No tracheal deviation. Cardiovascular:      Rate and Rhythm: Normal rate and regular rhythm. Pulses: Normal pulses. Heart sounds: Normal heart sounds. No murmur. No gallop. Pulmonary:      Effort: Pulmonary effort is normal. No respiratory distress. Breath sounds: Normal breath sounds. No wheezing or rales. Chest:      Chest wall: No tenderness. Abdominal:      General: Bowel sounds are normal. There is no distension. Palpations: Abdomen is soft. Tenderness: There is no abdominal tenderness. There is no guarding or rebound. Genitourinary:     Comments: Negative    Musculoskeletal: Normal range of motion. General: Tenderness (left lower extremity) present. Comments: Positive palpable pulses. Skin:     General: Skin is warm and dry. Capillary Refill: Capillary refill takes less than 2 seconds. Coloration: Skin is not pale. Findings: No erythema or rash. Neurological:      General: No focal deficit present. Mental Status: He is alert and oriented to person, place, and time. Motor: No weakness. Coordination: Coordination normal.   Psychiatric:         Mood and Affect: Mood normal.         Behavior: Behavior normal.         Thought Content:  Thought content normal.         Judgment: Judgment normal.          MDM  Number of Diagnoses or Management Options  Acute deep vein thrombosis (DVT) of proximal vein of left lower extremity ([facility] [address]): new and requires workup  Arthralgia of left lower leg: new and requires workup  Other acute pulmonary embolism, unspecified whether acute cor pulmonale present [facility]): new and requires workup  Diagnosis management comments: Differential diagnosis includes PE, DVT, musculoskeletal pain. Patient started on Eliquis in the emergency room. Discharged home with prescription. Follow-up with PCP. Bleeding precautions discussed. Patient in agreement with plan of care. Will return with worsening symptoms. Amount and/or Complexity of Data Reviewed  Clinical lab tests: ordered and reviewed  Tests in the radiology section of CPT®: ordered and reviewed  Discuss the patient with other providers: yes Mark Garcia  )         Labs Reviewed   CBC WITH AUTOMATED DIFF - Abnormal; Notable for the following components:       Result Value    IMMATURE GRANULOCYTES 1 (*)     ABS. MONOCYTES 1.2 (*)     ABS. EOSINOPHILS 0.6 (*)     ABS. IMM. GRANS. 0.1 (*)     All other components within normal limits   SAMPLES BEING HELD   POC CHEM8   POC CHEM8     Cta Chest W Or W Wo Cont    Result Date: 4/30/2021  Small distal branches pulmonary emboli.    6:03 PM  Pt has been reexamined. Pt has no new complaints, changes or physical findings. Care plan outlined and precautions discussed. All available results were reviewed with pt. All medications were reviewed with pt. All of pt's questions and concerns were addressed. Pt agrees to F/U as instructed and agrees to return to ED upon further deterioration. Pt is ready to go home.   Julianne Palomo NP      Procedures

## 2021-05-11 ENCOUNTER — OFFICE VISIT (OUTPATIENT)
Dept: CARDIOLOGY CLINIC | Age: 71
End: 2021-05-11
Payer: MEDICARE

## 2021-05-11 VITALS
SYSTOLIC BLOOD PRESSURE: 120 MMHG | HEART RATE: 98 BPM | HEIGHT: 73 IN | WEIGHT: 204 LBS | OXYGEN SATURATION: 98 % | BODY MASS INDEX: 27.04 KG/M2 | DIASTOLIC BLOOD PRESSURE: 86 MMHG

## 2021-05-11 DIAGNOSIS — Z13.220 SCREENING CHOLESTEROL LEVEL: Primary | ICD-10-CM

## 2021-05-11 DIAGNOSIS — G45.9 TIA (TRANSIENT ISCHEMIC ATTACK): ICD-10-CM

## 2021-05-11 PROCEDURE — 1101F PT FALLS ASSESS-DOCD LE1/YR: CPT | Performed by: SPECIALIST

## 2021-05-11 PROCEDURE — G0463 HOSPITAL OUTPT CLINIC VISIT: HCPCS | Performed by: SPECIALIST

## 2021-05-11 PROCEDURE — G8536 NO DOC ELDER MAL SCRN: HCPCS | Performed by: SPECIALIST

## 2021-05-11 PROCEDURE — G8432 DEP SCR NOT DOC, RNG: HCPCS | Performed by: SPECIALIST

## 2021-05-11 PROCEDURE — 99204 OFFICE O/P NEW MOD 45 MIN: CPT | Performed by: SPECIALIST

## 2021-05-11 PROCEDURE — G8427 DOCREV CUR MEDS BY ELIG CLIN: HCPCS | Performed by: SPECIALIST

## 2021-05-11 PROCEDURE — 3017F COLORECTAL CA SCREEN DOC REV: CPT | Performed by: SPECIALIST

## 2021-05-11 PROCEDURE — G8419 CALC BMI OUT NRM PARAM NOF/U: HCPCS | Performed by: SPECIALIST

## 2021-05-11 RX ORDER — FEXOFENADINE HCL 60 MG
TABLET ORAL
COMMUNITY

## 2021-05-11 NOTE — PATIENT INSTRUCTIONS
1) Penelope hinds [employer] 2) will do a stress echocardiogram 
 
3) fasting cholesterol in next several weeks at lab kylie 
 
4) return in 6 week 5)  may consider seeing hematologist 
 
6) would consider calcium scoring in next several mo.  OPTIONAL

## 2021-05-11 NOTE — PROGRESS NOTES
Tala Bernabe is a 72 y.o. male    Visit Vitals  /86 (BP 1 Location: Left upper arm, BP Patient Position: Sitting, BP Cuff Size: Adult)   Pulse 98   Ht 6' 1\" (1.854 m)   Wt 204 lb (92.5 kg)   SpO2 98%   BMI 26.91 kg/m²       Chief Complaint   Patient presents with    Other     L DVT    Other     PE       Chest pain UPPER LEFT SIDE OF CHEST  SOB NO  Dizziness NO  Swelling NO  Recent hospital visit NO  Refills NO    NUMBNESS IN LEFT HEEL

## 2021-05-11 NOTE — PROGRESS NOTES
CARDIOLOGY OFFICE NOTE    Solomon Hennessy MD, [address], [place], [address] [facility]  Phone [phone]; Fax [phone]  Mobile [phone]   Voice Mail [phone]    LAST OFFICE VISIT : Visit date not found  Gerardo Bolanos MD       ATTENTION:   This medical record was transcribed using an electronic medical records/speech recognition system. Although proofread, it may and can contain electronic, spelling and other errors. Corrections may be executed at a later time. Please feel free to contact us for any clarifications as needed. ICD-10-CM ICD-9-CM   1. Screening cholesterol level  Z13.220 V77.91   2. TIA (transient ischemic attack)  G45.9 435.9            Micha Chavez is a 72 y.o. male with  referred for    chest discomfort     Cardiac risk factors: male gender  I have personally obtained the history from the patient. HISTORY OF PRESENTING ILLNESS     Patient is a 67-year-old gentleman who does not appear to have any significant cardiac history. He does have moderate concentric hypertrophy and mild short outflow tract obstruction. He has some mild sinus of Valsalva and aortic root dilatation as well. He was seen in the emergency room on 4/30/2021 diagnosis of pulmonary embolus and left leg DVT. He now takes Eliquis. He is leiden factor V positive. He has complained of chest pain.          ACTIVE PROBLEM LIST     Patient Active Problem List    Diagnosis Date Noted    [facility] 02/26/2015    Factor V Leiden mutation ([facility] 02/23/2015           PAST MEDICAL HISTORY     Past Medical History:   Diagnosis Date    Arthritis     hands    Cancer ([facility]     prostate cancer    Depression     History of chicken pox     History of measles     History of rheumatic fever as a child     Ill-defined condition 1-    diviticulitis- treated with antibiotics- pain currently resolved    Ill-defined condition     hypoglycemic    Ill-defined condition     vitamin d deficiency    Ill-defined condition     Leiden Factor V- no hx of blood clots    Psychiatric disorder 2004    depression    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    Snoring            PAST SURGICAL HISTORY     Past Surgical History:   Procedure Laterality Date    HX APPENDECTOMY      HX HERNIA REPAIR      HX ORTHOPAEDIC  2007    right knee replacement    HX ORTHOPAEDIC  2001    left knee surgery    HX OTHER SURGICAL  1998    bilateral hernia repair    HX PROSTATECTOMY  2013    HX SHOULDER ARTHROSCOPY      HX TONSILLECTOMY            ALLERGIES     Allergies   Allergen Reactions    Celebrex [Celecoxib] Nausea Only    Sulfa (Sulfonamide Antibiotics) Swelling     Skin redness with oral sulfa  Topical sulfa causes swelling at the site          FAMILY HISTORY     Family History   Problem Relation Age of Onset    Cancer Father         prostate and esophageal cancer    Lung Disease Mother     Diabetes Maternal Grandfather     Cancer Sister         breast    Headache Sister     negative for cardiac disease       SOCIAL HISTORY     Social History     Socioeconomic History    Marital status:      Spouse name: Not on file    Number of children: Not on file    Years of education: Not on file    Highest education level: Not on file   Tobacco Use    Smoking status: Never Smoker    Smokeless tobacco: Never Used   Substance and Sexual Activity    Alcohol use: Yes     Alcohol/week: 11.0 standard drinks     Types: 5 Glasses of wine, 6 Cans of beer per week    Drug use: No    Sexual activity: Yes     Partners: Female     Birth control/protection: None         MEDICATIONS     Current Outpatient Medications   Medication Sig    fexofenadine (ALLEGRA) 60 mg tablet Take  by mouth.  acetaminophen/diphenhydramine (TYLENOL PM EXTRA STRENGTH PO) Take  by mouth.  AS NEEDED    apixaban (Eliquis DVT-PE Treat 30D Start) 5 mg (74 tabs) starter pack Take 10 mg (two 5 mg tablets) by mouth twice a day for 7 days   Followed by 5 mg (one 5 mg tablet) by mouth twice a day  Indications: blood clot in a deep vein of the extremities    sildenafil citrate (VIAGRA PO) Take  by mouth.  cyanocobalamin (VITAMIN B12) 500 mcg tablet Take 500 mcg by mouth daily.  lidocaine 4 % patch Wear up to 3 patches for 12 hours, then remove all patches for 12 hours.  buPROPion XL (WELLBUTRIN XL) 150 mg tablet Take 150 mg by mouth every morning.  therapeutic multivitamin (THERAGRAN) tablet Take 1 Tab by mouth daily.  cholecalciferol (VITAMIN D3) 1,000 unit tablet Take 1,000 Units by mouth daily.  TURMERIC PO Take  by mouth.  methocarbamol (ROBAXIN) 750 mg tablet Take 1 Tab by mouth four (4) times daily.  docusate sodium (COLACE) 100 mg capsule Take 1 Cap by mouth two (2) times a day.  oxybutynin (DITROPAN) 5 mg tablet Take 1 Tab by mouth every eight (8) hours as needed for Other (Bladder Spasm).  oxyCODONE IR (ROXICODONE) 5 mg immediate release tablet Take 1 Tab by mouth every four (4) hours as needed for Pain. Max Daily Amount: 30 mg.    dextroamphetamine-amphetamine (ADDERALL) 20 mg tablet Take 20 mg by mouth two (2) times a day.  omega-3 fatty acids-vitamin e (FISH OIL) 1,000 mg cap Take 1 Cap by mouth daily. No current facility-administered medications for this visit. I have reviewed the nurses notes, vitals, problem list, allergy list, medical history, family, social history and medications. REVIEW OF SYMPTOMS   Positive per HPI  General: Pt denies excessive weight gain or loss. Pt is able to conduct ADL's  HEENT: Denies blurred vision, headaches, hearing loss, epistaxis and difficulty swallowing. Respiratory: Denies cough, congestion, shortness of breath, PARKER, wheezing or stridor.   Cardiovascular: Denies precordial pain, palpitations, edema or PND  Gastrointestinal: Denies poor appetite, indigestion, abdominal pain or blood in stool  Genitourinary: Denies hematuria, dysuria, increased urinary frequency  Musculoskeletal: Denies joint pain or swelling from muscles or joints  Neurologic: Denies tremor, paresthesias, headache, or sensory motor disturbance  Psychiatric: Denies confusion, insomnia, depression  Integumentray: Denies rash, itching or ulcers. Hematologic: Denies easy bruising, bleeding     PHYSICAL EXAMINATION      Vitals:    05/11/21 1356   BP: 120/86   Pulse: 98   SpO2: 98%   Weight: 204 lb (92.5 kg)   Height: 6' 1\" (1.854 m)     General: Well developed, in no acute distress. HEENT: No jaundice, oral mucosa moist, no oral ulcers  Neck: Supple, no stiffness, no lymphadenopathy, supple  Heart:  Normal S1/S2 negative S3 or S4. Regular, no murmur, gallop or rub, no jugular venous distention  Respiratory: Clear bilaterally x 4, no wheezing or rales  Abdomen:   Soft, non-tender, bowel sounds are active. Extremities:  No edema, normal cap refill, no cyanosis. Musculoskeletal: No clubbing, no deformities  Neuro: A&Ox3, speech clear, gait stable, cooperative, no focal neurologic deficits  Skin: Skin color is normal. No rashes or lesions. Non diaphoretic, moist.  Vascular: 2+ pulses symmetric in all extremities      EKG: EKG is a normal sinus rhythm rate of sixty-nine with normal intervals     DIAGNOSTIC DATA     1. Echo   9/6/19- Moderate concentric hypertrophy.  Mild outflow tract obstruction, grade 2 DD, No ventricular septal defect present in the left ventricle, NATALIE,   Mild sinuses of Valsalva and aortic root dilatation.    There is mild concentric LVH with moderate eccentric LVH affecting the upper septum.  An outflow tract gradient was not demonstrated at rest but overall findings suggest this is a strong possibility in any situation of diminished venous loading.  There is lipomatous hypertrophy of the atrial septum with no sign of defect.  Left ventricular filling pressures are elevated and diastolic function is impaired.  Right heart size and pressures are borderline normal.     2. LE Venous Duplex   10/3/19-Right negative for deep venous thrombosis or thrombophlebitis. Left common femoral vein is thrombus free. 4/30/21-Left positive for acute occlusive deep vein thrombosis of the femoral, popliteal, and gastrocnemius veins. Positive for acute non-occlusive deep vein thrombosis of the left common femoral vein.    Left lower extremity duplex positive for acute occlusive superficial vein thrombosis of the small saphenous vein.      Right common femoral vein is thrombus free.    Evidence of prominent left groin lymph nodes. 3. CTA Chest   4/30/21-Small distal branches pulmonary emboli. 4. Lipids  2/25/20- , HDL 51, , TG 84         LABORATORY DATA            Lab Results   Component Value Date/Time    WBC 10.2 04/30/2021 03:58 PM    HGB 13.7 04/30/2021 03:58 PM    Hematocrit (POC) 41 04/30/2021 04:04 PM    HCT 42.4 04/30/2021 03:58 PM    PLATELET 655 27/00/6994 03:58 PM    MCV 95.5 04/30/2021 03:58 PM      Lab Results   Component Value Date/Time    Sodium 139 09/04/2019 09:39 AM    Potassium 4.5 09/04/2019 09:39 AM    Chloride 103 09/04/2019 09:39 AM    CO2 23 09/04/2019 09:39 AM    Anion gap 9 02/27/2015 02:32 AM    Glucose 89 09/04/2019 09:39 AM    BUN 16 09/04/2019 09:39 AM    Creatinine 1.13 09/04/2019 09:39 AM    BUN/Creatinine ratio 14 09/04/2019 09:39 AM    GFR est AA 77 09/04/2019 09:39 AM    GFR est non-AA 66 09/04/2019 09:39 AM    Calcium 9.7 09/04/2019 09:39 AM    Bilirubin, total 0.3 09/04/2019 09:39 AM    Alk.  phosphatase 83 09/04/2019 09:39 AM    Protein, total 6.9 09/04/2019 09:39 AM    Albumin 4.5 09/04/2019 09:39 AM    Globulin 2.8 02/19/2015 09:15 AM    A-G Ratio 1.9 09/04/2019 09:39 AM    ALT (SGPT) 20 09/04/2019 09:39 AM           ASSESSMENT/RECOMMENDATIONS:.      1.  Chest discomfort which I believe is related to the pulmonary embolus  -We will go forward with a stress echo in 2 months as well as a given information on calcium scoring. 2. LVH  -Blood pressure under good control and may require cardiac MRI to further evaluate his LVH  3. Recent pulmonary embolus etiology unknown except that he  Leiden 5 factor   -He is asking to consider should continue anticoagulation  -I think it would be prudent that he see a hematologist as there is no risk factor that resulted in this blood clot in his have one event. Also has had a history of prostate cancer cell cancer skin was on the hypercoagulable state as well so I will defer this to the hematologist  4. His cholesterol was not bad  -Recommend Mediterranean diet    Orders Placed This Encounter    LIVER FUNCTION PANEL     Standing Status:   Future     Standing Expiration Date:   5/11/2022    LIPID PANEL     Standing Status:   Future     Standing Expiration Date:   5/11/2022    fexofenadine (ALLEGRA) 60 mg tablet     Sig: Take  by mouth.  acetaminophen/diphenhydramine (TYLENOL PM EXTRA STRENGTH PO)     Sig: Take  by mouth. AS NEEDED       We discussed the expected course, resolution and complications of the diagnosis(es) in detail. Medication risks, benefits, costs, interactions, and alternatives were discussed as indicated. I advised him to contact the office if his condition worsens, changes or fails to improve as anticipated. He expressed understanding with the diagnosis(es) and plan          Follow-up and Dispositions  ·   Return in about 6 weeks (around 6/22/2021). I have discussed the diagnosis with  [facility] and the intended plan as seen in the above orders. Questions were answered concerning future plans. I have discussed medication side effects and warnings with the patient as well. Thank you,  Zina Bangura MD for involving me in the care of  [facility]. Please do not hesitate to contact me for further questions/concerns. Solomon Nation MD, [address]      [address] [facility]. Talha Rosario, [address] [facility]      [phone] / [phone] Fax

## 2021-05-15 LAB
ALBUMIN SERPL-MCNC: 4.2 G/DL (ref 3.8–4.8)
ALP SERPL-CCNC: 96 IU/L (ref 39–117)
ALT SERPL-CCNC: 19 IU/L (ref 0–44)
AST SERPL-CCNC: 17 IU/L (ref 0–40)
BILIRUB DIRECT SERPL-MCNC: 0.13 MG/DL (ref 0–0.4)
BILIRUB SERPL-MCNC: 0.3 MG/DL (ref 0–1.2)
CHOLEST SERPL-MCNC: 141 MG/DL (ref 100–199)
HDLC SERPL-MCNC: 38 MG/DL
IMP & REVIEW OF LAB RESULTS: NORMAL
LDLC SERPL CALC-MCNC: 80 MG/DL (ref 0–99)
PROT SERPL-MCNC: 6.4 G/DL (ref 6–8.5)
TRIGL SERPL-MCNC: 127 MG/DL (ref 0–149)
VLDLC SERPL CALC-MCNC: 23 MG/DL (ref 5–40)

## 2021-05-18 ENCOUNTER — TELEPHONE (OUTPATIENT)
Dept: CARDIOLOGY CLINIC | Age: 71
End: 2021-05-18

## 2021-05-18 DIAGNOSIS — G45.9 TIA (TRANSIENT ISCHEMIC ATTACK): ICD-10-CM

## 2021-05-18 DIAGNOSIS — Z13.220 SCREENING CHOLESTEROL LEVEL: Primary | ICD-10-CM

## 2021-05-18 NOTE — TELEPHONE ENCOUNTER
Patient calling in regards to recent office visit, states he was referred to a hematologist but has not heard back.      Phone: [phone]

## 2021-05-20 ENCOUNTER — TELEPHONE (OUTPATIENT)
Dept: ONCOLOGY | Age: 71
End: 2021-05-20

## 2021-05-20 NOTE — TELEPHONE ENCOUNTER
[address] at [facility]  [phone]        05/20/21 2:28 PM Called patient and advised of NP response. He voiced understanding. No further questions or concerns at this time.

## 2021-05-20 NOTE — TELEPHONE ENCOUNTER
Patient called and stated his Eliquis prescription will run out before his next appointment and is unsure what to do.  Please Advise    CB# [phone]

## 2021-06-08 ENCOUNTER — APPOINTMENT (OUTPATIENT)
Dept: CARDIOLOGY CLINIC | Age: 71
End: 2021-06-08

## 2021-06-08 ENCOUNTER — ANCILLARY PROCEDURE (OUTPATIENT)
Dept: CARDIOLOGY CLINIC | Age: 71
End: 2021-06-08
Payer: MEDICARE

## 2021-06-08 VITALS — WEIGHT: 200 LBS | HEIGHT: 73 IN | BODY MASS INDEX: 26.51 KG/M2

## 2021-06-08 DIAGNOSIS — G45.9 TIA (TRANSIENT ISCHEMIC ATTACK): ICD-10-CM

## 2021-06-08 DIAGNOSIS — R07.9 CHEST PAIN, UNSPECIFIED TYPE: ICD-10-CM

## 2021-06-08 PROCEDURE — 93351 STRESS TTE COMPLETE: CPT | Performed by: SPECIALIST

## 2021-06-14 LAB
ECHO AO ASC DIAM: 4.29 CM
ECHO AO ROOT DIAM: 3.94 CM
STRESS ANGINA INDEX: 0
STRESS BASELINE DIAS BP: 78 MMHG
STRESS BASELINE HR: 73 BPM
STRESS BASELINE SYS BP: 120 MMHG
STRESS ESTIMATED WORKLOAD: 10 METS
STRESS EXERCISE DUR MIN: NORMAL
STRESS O2 SAT PEAK: 98 %
STRESS O2 SAT REST: 97 %
STRESS PEAK DIAS BP: 88 MMHG
STRESS PEAK SYS BP: 164 MMHG
STRESS PERCENT HR ACHIEVED: 113 %
STRESS POST PEAK HR: 171 BPM
STRESS RATE PRESSURE PRODUCT: NORMAL BPM*MMHG
STRESS ST DEPRESSION: 0 MM
STRESS ST ELEVATION: 0 MM
STRESS TARGET HR: 151 BPM

## 2021-06-24 ENCOUNTER — OFFICE VISIT (OUTPATIENT)
Dept: CARDIOLOGY CLINIC | Age: 71
End: 2021-06-24
Payer: MEDICARE

## 2021-06-24 VITALS
DIASTOLIC BLOOD PRESSURE: 82 MMHG | SYSTOLIC BLOOD PRESSURE: 132 MMHG | HEART RATE: 78 BPM | HEIGHT: 73 IN | BODY MASS INDEX: 27.3 KG/M2 | OXYGEN SATURATION: 98 % | WEIGHT: 206 LBS

## 2021-06-24 DIAGNOSIS — G45.9 TIA (TRANSIENT ISCHEMIC ATTACK): Primary | ICD-10-CM

## 2021-06-24 DIAGNOSIS — R07.9 CHEST PAIN, UNSPECIFIED TYPE: ICD-10-CM

## 2021-06-24 DIAGNOSIS — Z13.220 SCREENING CHOLESTEROL LEVEL: ICD-10-CM

## 2021-06-24 PROCEDURE — G8427 DOCREV CUR MEDS BY ELIG CLIN: HCPCS | Performed by: SPECIALIST

## 2021-06-24 PROCEDURE — G8419 CALC BMI OUT NRM PARAM NOF/U: HCPCS | Performed by: SPECIALIST

## 2021-06-24 PROCEDURE — 1101F PT FALLS ASSESS-DOCD LE1/YR: CPT | Performed by: SPECIALIST

## 2021-06-24 PROCEDURE — 99214 OFFICE O/P EST MOD 30 MIN: CPT | Performed by: SPECIALIST

## 2021-06-24 PROCEDURE — 3017F COLORECTAL CA SCREEN DOC REV: CPT | Performed by: SPECIALIST

## 2021-06-24 PROCEDURE — G8510 SCR DEP NEG, NO PLAN REQD: HCPCS | Performed by: SPECIALIST

## 2021-06-24 PROCEDURE — G0463 HOSPITAL OUTPT CLINIC VISIT: HCPCS | Performed by: SPECIALIST

## 2021-06-24 PROCEDURE — G8536 NO DOC ELDER MAL SCRN: HCPCS | Performed by: SPECIALIST

## 2021-06-24 RX ORDER — APIXABAN 5 MG/1
5 TABLET, FILM COATED ORAL 2 TIMES DAILY
COMMUNITY
Start: 2021-05-27

## 2021-06-24 NOTE — PROGRESS NOTES
CARDIOLOGY OFFICE NOTE    Solomon Brown MD, [address], [place], [address] [facility]  Phone [phone]; Fax [phone]  Mobile [phone]   Voice Mail [phone]    LAST OFFICE VISIT : Visit date not found  Chivo Bhagat MD       ATTENTION:   This medical record was transcribed using an electronic medical records/speech recognition system. Although proofread, it may and can contain electronic, spelling and other errors. Corrections may be executed at a later time. Please feel free to contact us for any clarifications as needed. ICD-10-CM ICD-9-CM   1. TIA (transient ischemic attack)  G45.9 435.9   2. Chest pain, unspecified type  R07.9 786.50   3. Screening cholesterol level  Z13.220 V77.91            Benjy Card is a 72 y.o. male with  referred for    chest discomfort     Cardiac risk factors: male gender  I have personally obtained the history from the patient. HISTORY OF PRESENTING ILLNESS   From previous note  Patient is a 41-year-old gentleman who does not appear to have any significant cardiac history. He does have moderate concentric hypertrophy and mild short outflow tract obstruction. He has some mild sinus of Valsalva and aortic root dilatation as well. He was seen in the emergency room on 4/30/2021 diagnosis of pulmonary embolus and left leg DVT. He now takes Eliquis. He is leiden factor V positive. He has complained of chest pain. He has no interval complaints remains anticoagulated and is planning to see hematology.          ACTIVE PROBLEM LIST     Patient Active Problem List    Diagnosis Date Noted    Prostate cancer ([facility] 02/26/2015    Factor V Leiden mutation ([facility] 02/23/2015           PAST MEDICAL HISTORY     Past Medical History:   Diagnosis Date    Arthritis     hands    Cancer ([facility]     prostate cancer    Depression     History of chicken pox     History of measles     History of rheumatic fever as a child  Ill-defined condition 1-    diviticulitis- treated with antibiotics- pain currently resolved    Ill-defined condition     hypoglycemic    Ill-defined condition     vitamin d deficiency    Ill-defined condition     Leiden Factor V- no hx of blood clots    Psychiatric disorder 2004    depression    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    Snoring            PAST SURGICAL HISTORY     Past Surgical History:   Procedure Laterality Date    HX APPENDECTOMY      HX HERNIA REPAIR      HX ORTHOPAEDIC  2007    right knee replacement    HX ORTHOPAEDIC  2001    left knee surgery    HX OTHER SURGICAL  1998    bilateral hernia repair    HX PROSTATECTOMY  2013    HX SHOULDER ARTHROSCOPY      HX TONSILLECTOMY            ALLERGIES     Allergies   Allergen Reactions    Celebrex [Celecoxib] Nausea Only    Sulfa (Sulfonamide Antibiotics) Swelling     Skin redness with oral sulfa  Topical sulfa causes swelling at the site          FAMILY HISTORY     Family History   Problem Relation Age of Onset    Cancer Father         prostate and esophageal cancer    Lung Disease Mother     Diabetes Maternal Grandfather     Cancer Sister         breast    Headache Sister     negative for cardiac disease       SOCIAL HISTORY     Social History     Socioeconomic History    Marital status:      Spouse name: Not on file    Number of children: Not on file    Years of education: Not on file    Highest education level: Not on file   Tobacco Use    Smoking status: Never Smoker    Smokeless tobacco: Never Used   Substance and Sexual Activity    Alcohol use:  Yes     Alcohol/week: 11.0 standard drinks     Types: 5 Glasses of wine, 6 Cans of beer per week    Drug use: No    Sexual activity: Yes     Partners: Female     Birth control/protection: None     Social Determinants of Health     Financial Resource Strain:     Difficulty of Paying Living Expenses:    Food Insecurity:     Worried About Running Out of Food in the Last Year:    [address] in the Last Year:    Transportation Needs:     Lack of Transportation (Medical):  Lack of Transportation (Non-Medical):    Physical Activity:     Days of Exercise per Week:     Minutes of Exercise per Session:    Stress:     Feeling of Stress :    Social Connections:     Frequency of Communication with Friends and Family:     Frequency of Social Gatherings with Friends and Family:     Attends [employer]ze Services:     Active Member of Clubs or Organizations:     Attends Club or Organization Meetings:     Marital Status:          MEDICATIONS     Current Outpatient Medications   Medication Sig    Eliquis 5 mg tablet Take 5 mg by mouth two (2) times a day.  fexofenadine (ALLEGRA) 60 mg tablet Take  by mouth.  acetaminophen/diphenhydramine (TYLENOL PM EXTRA STRENGTH PO) Take  by mouth. AS NEEDED    sildenafil citrate (VIAGRA PO) Take  by mouth.  cyanocobalamin (VITAMIN B12) 500 mcg tablet Take 500 mcg by mouth daily.  lidocaine 4 % patch Wear up to 3 patches for 12 hours, then remove all patches for 12 hours.  buPROPion XL (WELLBUTRIN XL) 150 mg tablet Take 150 mg by mouth every morning.  therapeutic multivitamin (THERAGRAN) tablet Take 1 Tab by mouth daily.  cholecalciferol (VITAMIN D3) 1,000 unit tablet Take 1,000 Units by mouth daily.  apixaban (Eliquis DVT-PE Treat 30D Start) 5 mg (74 tabs) starter pack Take 10 mg (two 5 mg tablets) by mouth twice a day for 7 days   Followed by 5 mg (one 5 mg tablet) by mouth twice a day  Indications: blood clot in a deep vein of the extremities     No current facility-administered medications for this visit. I have reviewed the nurses notes, vitals, problem list, allergy list, medical history, family, social history and medications. REVIEW OF SYMPTOMS   Positive per HPI  General: Pt denies excessive weight gain or loss.  Pt is able to conduct ADL's  HEENT: Denies blurred vision, headaches, hearing loss, epistaxis and difficulty swallowing. Respiratory: Denies cough, congestion, shortness of breath, PARKER, wheezing or stridor. Cardiovascular: Denies precordial pain, palpitations, edema or PND  Gastrointestinal: Denies poor appetite, indigestion, abdominal pain or blood in stool  Genitourinary: Denies hematuria, dysuria, increased urinary frequency  Musculoskeletal: Denies joint pain or swelling from muscles or joints  Neurologic: Denies tremor, paresthesias, headache, or sensory motor disturbance  Psychiatric: Denies confusion, insomnia, depression  Integumentray: Denies rash, itching or ulcers. Hematologic: Denies easy bruising, bleeding     PHYSICAL EXAMINATION      Vitals:    06/24/21 1402   BP: 132/82   Pulse: 78   SpO2: 98%   Weight: 206 lb (93.4 kg)   Height: 6' 1\" (1.854 m)     General: Well developed, in no acute distress. HEENT: No jaundice, oral mucosa moist, no oral ulcers  Neck: Supple, no stiffness, no lymphadenopathy, supple  Heart:  Normal S1/S2 negative S3 or S4. Regular, no murmur, gallop or rub, no jugular venous distention  Respiratory: Clear bilaterally x 4, no wheezing or rales  Extremities:  No edema, normal cap refill, no cyanosis. Musculoskeletal: No clubbing, no deformities  Neuro: A&Ox3, speech clear, gait stable, cooperative, no focal neurologic deficits  Skin: Skin color is normal. No rashes or lesions. Non diaphoretic, moist.      EKG: EKG is a normal sinus rhythm rate of sixty-nine with normal intervals     DIAGNOSTIC DATA     1. Echo   9/6/19- Moderate concentric hypertrophy.  Mild outflow tract obstruction, grade 2 DD, No ventricular septal defect present in the left ventricle, NATALIE,   Mild sinuses of Valsalva and aortic root dilatation.    There is mild concentric LVH with moderate eccentric LVH affecting the upper septum.  An outflow tract gradient was not demonstrated at rest but overall findings suggest this is a strong possibility in any situation of diminished venous loading.  There is lipomatous hypertrophy of the atrial septum with no sign of defect.  Left ventricular filling pressures are elevated and diastolic function is impaired.  Right heart size and pressures are borderline normal.     2. LE Venous Duplex   10/3/19-Right negative for deep venous thrombosis or thrombophlebitis. Left common femoral vein is thrombus free. 4/30/21-Left positive for acute occlusive deep vein thrombosis of the femoral, popliteal, and gastrocnemius veins. Positive for acute non-occlusive deep vein thrombosis of the left common femoral vein.    Left lower extremity duplex positive for acute occlusive superficial vein thrombosis of the small saphenous vein.      Right common femoral vein is thrombus free.    Evidence of prominent left groin lymph nodes. 3. CTA Chest   4/30/21-Small distal branches pulmonary emboli. 4. Lipids   2/25/20- , HDL 51, , TG 84   5/14/21- , HDL 38, LDL 80,     5. Stress Test   6/8/21-Stress Echo-normal, mets 10, 7 min, EF 60%         LABORATORY DATA            Lab Results   Component Value Date/Time    WBC 10.2 04/30/2021 03:58 PM    HGB 13.7 04/30/2021 03:58 PM    Hematocrit (POC) 41 04/30/2021 04:04 PM    HCT 42.4 04/30/2021 03:58 PM    PLATELET 123 17/62/9822 03:58 PM    MCV 95.5 04/30/2021 03:58 PM      Lab Results   Component Value Date/Time    Sodium 139 09/04/2019 09:39 AM    Potassium 4.5 09/04/2019 09:39 AM    Chloride 103 09/04/2019 09:39 AM    CO2 23 09/04/2019 09:39 AM    Anion gap 9 02/27/2015 02:32 AM    Glucose 89 09/04/2019 09:39 AM    BUN 16 09/04/2019 09:39 AM    Creatinine 1.13 09/04/2019 09:39 AM    BUN/Creatinine ratio 14 09/04/2019 09:39 AM    GFR est AA 77 09/04/2019 09:39 AM    GFR est non-AA 66 09/04/2019 09:39 AM    Calcium 9.7 09/04/2019 09:39 AM    Bilirubin, total 0.3 05/14/2021 09:27 AM    Alk.  phosphatase 96 05/14/2021 09:27 AM    Protein, total 6.4 05/14/2021 09:27 AM    Albumin 4.2 05/14/2021 09:27 AM    Globulin 2.8 02/19/2015 09:15 AM    A-G Ratio 1.9 09/04/2019 09:39 AM    ALT (SGPT) 19 05/14/2021 09:27 AM           ASSESSMENT/RECOMMENDATIONS:.      1.  Chest discomfort   -Stress test was normal with normal perfusion  2. LVH  -Blood pressure under good control   -Recheck echo in a year  3. Recent pulmonary embolus etiology unknown except that he  Leiden 5 factor   - will be seeing rheumatology soon  -On Eliquis with no bleeding issues  4. His cholesterol   -Is at goal with LDL of 80    Orders Placed This Encounter    Eliquis 5 mg tablet     Sig: Take 5 mg by mouth two (2) times a day. We discussed the expected course, resolution and complications of the diagnosis(es) in detail. Medication risks, benefits, costs, interactions, and alternatives were discussed as indicated. I advised him to contact the office if his condition worsens, changes or fails to improve as anticipated. He expressed understanding with the diagnosis(es) and plan          Follow-up and Dispositions  ·   Return in about 1 year (around 6/24/2022). I have discussed the diagnosis with  [facility] and the intended plan as seen in the above orders. Questions were answered concerning future plans. I have discussed medication side effects and warnings with the patient as well. Thank you,  Jonathan Beyer MD for involving me in the care of  [facility]. Please do not hesitate to contact me for further questions/concerns. Solomon Nation MD, [address]      [address] [place] [address], [address] [place] [address] Drive      [phone] / [phone] Fax

## 2021-06-24 NOTE — PROGRESS NOTES
1) Penelope or guzman [employer]     2) will do a stress echocardiogram     3) fasting cholesterol in next several weeks at lab bydb-1-85-21     4) return in 6 week     5)  may consider seeing hematologist     6) would consider calcium scoring in next several mo. OPTIONAL    Room 3  Test results     Appt.  on Monday with Hematologist     Visit Vitals  /82 (BP 1 Location: Left upper arm, BP Patient Position: Sitting, BP Cuff Size: Adult)   Pulse 78   Ht 6' 1\" (1.854 m)   Wt 206 lb (93.4 kg)   SpO2 98%   BMI 27.18 kg/m²         Chest pain: no  Shortness of breath: no  Edema: no  Palpitations: no  Dizziness: no    New diagnosis/Surgeries: no    ER/Hospitalizations: no

## 2022-06-30 ENCOUNTER — OFFICE VISIT (OUTPATIENT)
Dept: CARDIOLOGY CLINIC | Age: 72
End: 2022-06-30
Payer: MEDICARE

## 2022-06-30 VITALS
HEIGHT: 73 IN | HEART RATE: 73 BPM | DIASTOLIC BLOOD PRESSURE: 60 MMHG | OXYGEN SATURATION: 98 % | BODY MASS INDEX: 27.18 KG/M2 | SYSTOLIC BLOOD PRESSURE: 100 MMHG

## 2022-06-30 DIAGNOSIS — R07.9 CHEST PAIN, UNSPECIFIED TYPE: ICD-10-CM

## 2022-06-30 DIAGNOSIS — D68.51 FACTOR V LEIDEN MUTATION (HCC): ICD-10-CM

## 2022-06-30 DIAGNOSIS — I26.99 PULMONARY EMBOLISM WITHOUT ACUTE COR PULMONALE, UNSPECIFIED CHRONICITY, UNSPECIFIED PULMONARY EMBOLISM TYPE (HCC): Primary | ICD-10-CM

## 2022-06-30 PROCEDURE — G8432 DEP SCR NOT DOC, RNG: HCPCS | Performed by: SPECIALIST

## 2022-06-30 PROCEDURE — 1123F ACP DISCUSS/DSCN MKR DOCD: CPT | Performed by: SPECIALIST

## 2022-06-30 PROCEDURE — G0463 HOSPITAL OUTPT CLINIC VISIT: HCPCS | Performed by: SPECIALIST

## 2022-06-30 PROCEDURE — G8417 CALC BMI ABV UP PARAM F/U: HCPCS | Performed by: SPECIALIST

## 2022-06-30 PROCEDURE — G8536 NO DOC ELDER MAL SCRN: HCPCS | Performed by: SPECIALIST

## 2022-06-30 PROCEDURE — 1101F PT FALLS ASSESS-DOCD LE1/YR: CPT | Performed by: SPECIALIST

## 2022-06-30 PROCEDURE — 99214 OFFICE O/P EST MOD 30 MIN: CPT | Performed by: SPECIALIST

## 2022-06-30 PROCEDURE — G8427 DOCREV CUR MEDS BY ELIG CLIN: HCPCS | Performed by: SPECIALIST

## 2022-06-30 PROCEDURE — 3017F COLORECTAL CA SCREEN DOC REV: CPT | Performed by: SPECIALIST

## 2022-06-30 NOTE — PROGRESS NOTES
Chief Complaint   Patient presents with    Follow-up     Annual      Visit Vitals  Ht 6' 1\" (1.854 m)   BMI 27.18 kg/m²     Chest pain denied   SOB denied   Palpitations denied   Swelling in hands/feet denied   Dizziness denied   Recent hospital stays denied   Refills denied   Vitals:    06/30/22 0832   BP: 100/60   BP 1 Location: Left upper arm   BP Patient Position: Sitting   Pulse: 73   Height: 6' 1\" (1.854 m)   SpO2: 98%

## 2022-06-30 NOTE — PROGRESS NOTES
Wilbert Beltran MD. [facility] [place]              Patient: Sabrina Daley  : 1950      Today's Date: 2022          HISTORY OF PRESENT ILLNESS:     History of Present Illness:  Here for FU. Has done well past year. No problems. No recurrent DVT / PE on [address]. Walks daily - plays golf. No bleeding. Some PARKER climbing 3 flights of stairs. PAST MEDICAL HISTORY:     Past Medical History:   Diagnosis Date    Arthritis     hands    Cancer ([facility] [address]     prostate cancer    Depression     DVT (deep venous thrombosis) (HCC)     History of chicken pox     History of measles     History of rheumatic fever as a child     Ill-defined condition 2015    diviticulitis- treated with antibiotics- pain currently resolved    Ill-defined condition     hypoglycemic    Ill-defined condition     vitamin d deficiency    Ill-defined condition     Leiden Factor V- no hx of blood clots    Psychiatric disorder     depression    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    Pulmonary embolism ([facility] [address]     Snoring        Past Surgical History:   Procedure Laterality Date    HX APPENDECTOMY      HX HERNIA REPAIR      HX ORTHOPAEDIC      right knee replacement    HX ORTHOPAEDIC      left knee surgery    HX OTHER SURGICAL  1998    bilateral hernia repair    HX PROSTATECTOMY      HX SHOULDER ARTHROSCOPY      HX TONSILLECTOMY           MEDICATIONS:     Current Outpatient Medications   Medication Sig Dispense Refill    Eliquis 5 mg tablet Take 5 mg by mouth two (2) times a day.  fexofenadine (ALLEGRA) 60 mg tablet Take  by mouth.  acetaminophen/diphenhydramine (TYLENOL PM EXTRA STRENGTH PO) Take  by mouth. AS NEEDED      sildenafil citrate (VIAGRA PO) Take  by mouth.  cyanocobalamin (VITAMIN B12) 500 mcg tablet Take 500 mcg by mouth daily.  lidocaine 4 % patch Wear up to 3 patches for 12 hours, then remove all patches for 12 hours.  30 Patch 0    buPROPion XL [facility] XL) 150 mg tablet Take 150 mg by mouth every morning.  therapeutic multivitamin (THERAGRAN) tablet Take 1 Tab by mouth daily.  cholecalciferol (VITAMIN D3) 1,000 unit tablet Take 1,000 Units by mouth daily. Allergies   Allergen Reactions    Celebrex [Celecoxib] Nausea Only    Sulfa (Sulfonamide Antibiotics) Swelling     Skin redness with oral sulfa  Topical sulfa causes swelling at the site           SOCIAL HISTORY:     Social History     Tobacco Use    Smoking status: Never Smoker    Smokeless tobacco: Never Used   Substance Use Topics    Alcohol use: Yes     Alcohol/week: 11.0 standard drinks     Types: 5 Glasses of wine, 6 Cans of beer per week    Drug use: No         FAMILY HISTORY:     Family History   Problem Relation Age of Onset    Cancer Father         prostate and esophageal cancer    Lung Disease Mother     Diabetes Maternal Grandfather     Cancer Sister         breast    Headache Sister            REVIEW OF SYMPTOMS:     Review of Symptoms:  Constitutional: Negative for fever, chills  HEENT: Negative for nosebleeds, tinnitus, and vision changes. Respiratory: Negative for cough, wheezing  Cardiovascular: Negative for orthopnea, claudication, syncope, and PND. Gastrointestinal: Negative for abdominal pain, diarrhea, melena. Genitourinary: Negative for dysuria  Musculoskeletal: Negative for myalgias. Skin: Negative for rash  Heme: No problems bleeding. Neurological: Negative for speech change and focal weakness. PHYSICAL EXAM:     Physical Exam:  Visit Vitals  /60 (BP 1 Location: Left upper arm, BP Patient Position: Sitting)   Pulse 73   Ht 6' 1\" (1.854 m)   SpO2 98%   BMI 27.18 kg/m²     Patient appears generally well, mood and affect are appropriate and pleasant. HEENT:  Hearing intact, non-icteric, normocephalic, atraumatic. Neck Exam: Supple   Lung Exam: Clear to auscultation, even breath sounds.    Cardiac Exam: Regular rate and rhythm with no murmur or rub  Abdomen: Soft, non-tender  Extremities: Moves all ext well. No lower extremity edema. MSKTL: Overall good ROM ext  Skin: No significant rashes  Psych: Appropriate affect  Neuro - Grossly intact      LABS / OTHER STUDIES reviewed:     Lab Results   Component Value Date/Time    Cholesterol, total 141 05/14/2021 09:27 AM    HDL Cholesterol 38 (L) 05/14/2021 09:27 AM    LDL, calculated 80 05/14/2021 09:27 AM    VLDL, calculated 23 05/14/2021 09:27 AM    Triglyceride 127 05/14/2021 09:27 AM     Lab Results   Component Value Date/Time    Sodium 139 09/04/2019 09:39 AM    Potassium 4.5 09/04/2019 09:39 AM    Chloride 103 09/04/2019 09:39 AM    CO2 23 09/04/2019 09:39 AM    Anion gap 9 02/27/2015 02:32 AM    Glucose 89 09/04/2019 09:39 AM    BUN 16 09/04/2019 09:39 AM    Creatinine 1.13 09/04/2019 09:39 AM    BUN/Creatinine ratio 14 09/04/2019 09:39 AM    GFR est AA 77 09/04/2019 09:39 AM    GFR est non-AA 66 09/04/2019 09:39 AM    Calcium 9.7 09/04/2019 09:39 AM    Bilirubin, total 0.3 05/14/2021 09:27 AM    Alk. phosphatase 96 05/14/2021 09:27 AM    Protein, total 6.4 05/14/2021 09:27 AM    Albumin 4.2 05/14/2021 09:27 AM    Globulin 2.8 02/19/2015 09:15 AM    A-G Ratio 1.9 09/04/2019 09:39 AM    ALT (SGPT) 19 05/14/2021 09:27 AM    AST (SGOT) 17 05/14/2021 09:27 AM     Lab Results   Component Value Date/Time    WBC 10.2 04/30/2021 03:58 PM    HGB 13.7 04/30/2021 03:58 PM    Hematocrit (POC) 41 04/30/2021 04:04 PM    HCT 42.4 04/30/2021 03:58 PM    PLATELET 508 30/51/6295 03:58 PM    MCV 95.5 04/30/2021 03:58 PM         CARDIAC DIAGNOSTICS:     Cardiac Evaluation Includes:  I reviewed the results below. 1. Echo   9/6/19- mod LVH     2. LE Venous Duplex   10/3/19-Right negative for deep venous thrombosis or thrombophlebitis. Left common femoral vein is thrombus free. 4/30/21-Left positive for acute occlusive deep vein thrombosis of the femoral, popliteal, and gastrocnemius veins.  Positive for acute non-occlusive deep vein thrombosis of the left common femoral vein.    Left lower extremity duplex positive for acute occlusive superficial vein thrombosis of the small saphenous vein.      Right common femoral vein is thrombus free.    Evidence of prominent left groin lymph nodes.      3. CTA Chest   4/30/21-Small distal branches pulmonary emboli.      4. Lipids   2/25/20- , HDL 51, , TG 84   5/14/21- , HDL 38, LDL 80,      5. Stress Test   6/8/21-Stress Echo-normal, mets 10, 7 min, EF 60%         EKG 4/6/21 - NSR, normal         ASSESSMENT AND PLAN:     Assessment and Plan:    1)  Chest discomfort with Acute PE   - Stress test was normal   - no further CP    2)  LVH on prior echo   - Blood pressure under good control     3)  PE 2021  - Chest CTA 4/30/21 - Small distal branches pulmonary emboli.  - On Eliquis with no bleeding issues ([address] started by Heme)   - cont OAC indefinitely     4) Lipids were OK   - consider a CT heart scan for risk assessment - info given       5) COVID + 6/22   - doing fine     6) See me PRN    Went to [place] in June 2022 ([place] multiple times). Retired  Oscar Azevedo). Néstor Parikh MD, [place] [id number]  [address], [address] Riri Malik [address] [place] [address]  Ph: [phone]   Ph [phone]

## 2023-03-21 ENCOUNTER — APPOINTMENT (OUTPATIENT)
Dept: GENERAL RADIOLOGY | Age: 73
End: 2023-03-21
Attending: STUDENT IN AN ORGANIZED HEALTH CARE EDUCATION/TRAINING PROGRAM
Payer: MEDICARE

## 2023-03-21 ENCOUNTER — HOSPITAL ENCOUNTER (EMERGENCY)
Age: 73
Discharge: HOME | End: 2023-03-21
Attending: STUDENT IN AN ORGANIZED HEALTH CARE EDUCATION/TRAINING PROGRAM
Payer: MEDICARE

## 2023-03-21 VITALS
BODY MASS INDEX: 27.17 KG/M2 | RESPIRATION RATE: 20 BRPM | OXYGEN SATURATION: 95 % | HEIGHT: 73 IN | SYSTOLIC BLOOD PRESSURE: 126 MMHG | DIASTOLIC BLOOD PRESSURE: 87 MMHG | TEMPERATURE: 98.1 F | WEIGHT: 205 LBS | HEART RATE: 84 BPM

## 2023-03-21 DIAGNOSIS — R55 POSTURAL DIZZINESS WITH PRESYNCOPE: ICD-10-CM

## 2023-03-21 DIAGNOSIS — N17.9 AKI (ACUTE KIDNEY INJURY) (HCC): ICD-10-CM

## 2023-03-21 DIAGNOSIS — R42 POSTURAL DIZZINESS WITH PRESYNCOPE: ICD-10-CM

## 2023-03-21 DIAGNOSIS — K52.9 GASTROENTERITIS, ACUTE: Primary | ICD-10-CM

## 2023-03-21 LAB
ALBUMIN SERPL-MCNC: 3.9 G/DL (ref 3.5–5)
ALBUMIN/GLOB SERPL: 1 (ref 1.1–2.2)
ALP SERPL-CCNC: 97 U/L (ref 45–117)
ALT SERPL-CCNC: 35 U/L (ref 12–78)
ANION GAP SERPL CALC-SCNC: 4 MMOL/L (ref 5–15)
APPEARANCE UR: CLEAR
AST SERPL-CCNC: 20 U/L (ref 15–37)
ATRIAL RATE: 83 BPM
BACTERIA URNS QL MICRO: NEGATIVE /HPF
BASOPHILS # BLD: 0.1 K/UL (ref 0–0.1)
BASOPHILS NFR BLD: 1 % (ref 0–1)
BILIRUB SERPL-MCNC: 0.6 MG/DL (ref 0.2–1)
BILIRUB UR QL: NEGATIVE
BNP SERPL-MCNC: 43 PG/ML
BUN SERPL-MCNC: 22 MG/DL (ref 6–20)
BUN/CREAT SERPL: 15 (ref 12–20)
CALCIUM SERPL-MCNC: 9.5 MG/DL (ref 8.5–10.1)
CALCULATED P AXIS, ECG09: 91 DEGREES
CALCULATED R AXIS, ECG10: -23 DEGREES
CALCULATED T AXIS, ECG11: 18 DEGREES
CHLORIDE SERPL-SCNC: 106 MMOL/L (ref 97–108)
CO2 SERPL-SCNC: 28 MMOL/L (ref 21–32)
COLOR UR: YELLOW
COMMENT, HOLDF: NORMAL
CREAT SERPL-MCNC: 1.47 MG/DL (ref 0.7–1.3)
DIAGNOSIS, 93000: NORMAL
DIFFERENTIAL METHOD BLD: ABNORMAL
EOSINOPHIL # BLD: 0.2 K/UL (ref 0–0.4)
EOSINOPHIL NFR BLD: 2 % (ref 0–7)
EPITH CASTS URNS QL MICRO: ABNORMAL /LPF
ERYTHROCYTE [DISTWIDTH] IN BLOOD BY AUTOMATED COUNT: 12.4 % (ref 11.5–14.5)
GLOBULIN SER CALC-MCNC: 4 G/DL (ref 2–4)
GLUCOSE SERPL-MCNC: 134 MG/DL (ref 65–100)
GLUCOSE UR STRIP.AUTO-MCNC: NEGATIVE MG/DL
HCT VFR BLD AUTO: 48.7 % (ref 36.6–50.3)
HGB BLD-MCNC: 15.9 G/DL (ref 12.1–17)
HGB UR QL STRIP: NEGATIVE
HYALINE CASTS URNS QL MICRO: ABNORMAL /LPF (ref 0–2)
IMM GRANULOCYTES # BLD AUTO: 0.1 K/UL (ref 0–0.04)
IMM GRANULOCYTES NFR BLD AUTO: 1 % (ref 0–0.5)
KETONES UR QL STRIP.AUTO: ABNORMAL MG/DL
LEUKOCYTE ESTERASE UR QL STRIP.AUTO: NEGATIVE
LIPASE SERPL-CCNC: 164 U/L (ref 73–393)
LYMPHOCYTES # BLD: 1.2 K/UL (ref 0.8–3.5)
LYMPHOCYTES NFR BLD: 10 % (ref 12–49)
MAGNESIUM SERPL-MCNC: 2.1 MG/DL (ref 1.6–2.4)
MCH RBC QN AUTO: 30.5 PG (ref 26–34)
MCHC RBC AUTO-ENTMCNC: 32.6 G/DL (ref 30–36.5)
MCV RBC AUTO: 93.3 FL (ref 80–99)
MONOCYTES # BLD: 0.8 K/UL (ref 0–1)
MONOCYTES NFR BLD: 6 % (ref 5–13)
NEUTS SEG # BLD: 9.9 K/UL (ref 1.8–8)
NEUTS SEG NFR BLD: 80 % (ref 32–75)
NITRITE UR QL STRIP.AUTO: NEGATIVE
NRBC # BLD: 0 K/UL (ref 0–0.01)
NRBC BLD-RTO: 0 PER 100 WBC
P-R INTERVAL, ECG05: 184 MS
PH UR STRIP: 5 (ref 5–8)
PHOSPHATE SERPL-MCNC: 3.6 MG/DL (ref 2.6–4.7)
PLATELET # BLD AUTO: 261 K/UL (ref 150–400)
PMV BLD AUTO: 9.9 FL (ref 8.9–12.9)
POTASSIUM SERPL-SCNC: 4.4 MMOL/L (ref 3.5–5.1)
PROT SERPL-MCNC: 7.9 G/DL (ref 6.4–8.2)
PROT UR STRIP-MCNC: ABNORMAL MG/DL
Q-T INTERVAL, ECG07: 374 MS
QRS DURATION, ECG06: 76 MS
QTC CALCULATION (BEZET), ECG08: 439 MS
RBC # BLD AUTO: 5.22 M/UL (ref 4.1–5.7)
RBC #/AREA URNS HPF: ABNORMAL /HPF (ref 0–5)
SAMPLES BEING HELD,HOLD: NORMAL
SODIUM SERPL-SCNC: 138 MMOL/L (ref 136–145)
SP GR UR REFRACTOMETRY: 1.03 (ref 1–1.03)
TROPONIN I SERPL HS-MCNC: 5 NG/L (ref 0–76)
UROBILINOGEN UR QL STRIP.AUTO: 1 EU/DL (ref 0.2–1)
VENTRICULAR RATE, ECG03: 83 BPM
WBC # BLD AUTO: 12.3 K/UL (ref 4.1–11.1)
WBC URNS QL MICRO: ABNORMAL /HPF (ref 0–4)

## 2023-03-21 PROCEDURE — 71045 X-RAY EXAM CHEST 1 VIEW: CPT

## 2023-03-21 PROCEDURE — 96361 HYDRATE IV INFUSION ADD-ON: CPT

## 2023-03-21 PROCEDURE — 84484 ASSAY OF TROPONIN QUANT: CPT

## 2023-03-21 PROCEDURE — 36415 COLL VENOUS BLD VENIPUNCTURE: CPT

## 2023-03-21 PROCEDURE — 83690 ASSAY OF LIPASE: CPT

## 2023-03-21 PROCEDURE — 83880 ASSAY OF NATRIURETIC PEPTIDE: CPT

## 2023-03-21 PROCEDURE — 84100 ASSAY OF PHOSPHORUS: CPT

## 2023-03-21 PROCEDURE — 83735 ASSAY OF MAGNESIUM: CPT

## 2023-03-21 PROCEDURE — 93005 ELECTROCARDIOGRAM TRACING: CPT

## 2023-03-21 PROCEDURE — 81001 URINALYSIS AUTO W/SCOPE: CPT

## 2023-03-21 PROCEDURE — 96374 THER/PROPH/DIAG INJ IV PUSH: CPT

## 2023-03-21 PROCEDURE — 74011250636 HC RX REV CODE- 250/636: Performed by: STUDENT IN AN ORGANIZED HEALTH CARE EDUCATION/TRAINING PROGRAM

## 2023-03-21 PROCEDURE — 80053 COMPREHEN METABOLIC PANEL: CPT

## 2023-03-21 PROCEDURE — 99285 EMERGENCY DEPT VISIT HI MDM: CPT

## 2023-03-21 PROCEDURE — 85025 COMPLETE CBC W/AUTO DIFF WBC: CPT

## 2023-03-21 RX ORDER — ONDANSETRON 4 MG/1
4 TABLET, FILM COATED ORAL
Qty: 20 TABLET | Refills: 0 | Status: SHIPPED | OUTPATIENT
Start: 2023-03-21

## 2023-03-21 RX ORDER — ONDANSETRON 4 MG/1
4 TABLET, FILM COATED ORAL
Qty: 20 TABLET | Refills: 0 | Status: SHIPPED | OUTPATIENT
Start: 2023-03-21 | End: 2023-03-21 | Stop reason: SDUPTHER

## 2023-03-21 RX ORDER — ONDANSETRON 2 MG/ML
4 INJECTION INTRAMUSCULAR; INTRAVENOUS ONCE
Status: COMPLETED | OUTPATIENT
Start: 2023-03-21 | End: 2023-03-21

## 2023-03-21 RX ADMIN — SODIUM CHLORIDE, POTASSIUM CHLORIDE, SODIUM LACTATE AND CALCIUM CHLORIDE 1000 ML: 600; 310; 30; 20 INJECTION, SOLUTION INTRAVENOUS at 14:07

## 2023-03-21 RX ADMIN — ONDANSETRON 4 MG: 2 INJECTION INTRAMUSCULAR; INTRAVENOUS at 14:10

## 2023-03-21 NOTE — DISCHARGE INSTRUCTIONS
Return to the ER with any new or worsening symptoms. Stay as well hydrated as possible and follow-up with your primary care doctor.

## 2023-03-21 NOTE — ED PROVIDER NOTES
52-year-old man presenting due to vomiting and near syncope. Patient signed out to me by Dr. Mayur Forbes pending results of urinalysis. Please see previous notes for details. Urinalysis shows no sign of infection. Patient has a mild elevation in his creatinine compared to prior labs but nothing significant. He is feeling much better on reassessment. Patient discharged in stable condition.

## 2023-03-21 NOTE — ED PROVIDER NOTES
20-year-old male who presents for a presyncopal event. Patient went to visit friends over the weekend and one of his friends was sick with diarrhea. He had an episode of diarrhea on Saturday but then thought he was doing better. He then had several more episodes of nonbloody watery diarrhea today as well as an episode of vomiting. He had gone to see his  and when he stood up after sitting in that meeting, he felt lightheaded, like he might pass out, and saw colorful shapes in his vision. He sat down and did not fall to the ground. Patient currently is still feeling lightheaded but not presyncopal.  He had had coffee but no water earlier in the day. No history of cardiac problems. Patient had a cardiac work-up including stress test 2 years ago and reports that he \"passed with flying colors. \"  He saw his cardiologist again for follow-up last year and again had no issues. Patient is not having abdominal pain, fever. He reports a mild headache. Patient reports he has had cough and congestion for several days now including before these events started. The history is provided by the patient and the spouse. Vomiting   Associated symptoms include diarrhea, headaches, cough and headaches. Pertinent negatives include no fever, no abdominal pain and no arthralgias.       Past Medical History:   Diagnosis Date    Arthritis     hands    Cancer ([facility] [address]     prostate cancer    Depression     DVT (deep venous thrombosis) (HCC)     History of chicken pox     History of measles     History of rheumatic fever as a child     Ill-defined condition 1-    diviticulitis- treated with antibiotics- pain currently resolved    Ill-defined condition     hypoglycemic    Ill-defined condition     vitamin d deficiency    Ill-defined condition     Leiden Factor V- no hx of blood clots    Psychiatric disorder 2004    depression    Psychiatric disorder 2004    adult [facility]    Pulmonary embolism (HCC)     Snoring Past Surgical History:   Procedure Laterality Date    HX APPENDECTOMY      HX HERNIA REPAIR      HX ORTHOPAEDIC  2007    right knee replacement    HX ORTHOPAEDIC  2001    left knee surgery    HX OTHER SURGICAL  1998    bilateral hernia repair    HX PROSTATECTOMY  2013    HX SHOULDER ARTHROSCOPY      HX TONSILLECTOMY           Family History:   Problem Relation Age of Onset    Cancer Father         prostate and esophageal cancer    Lung Disease Mother     Diabetes Maternal Grandfather     Cancer Sister         breast    Headache Sister        Social History     Socioeconomic History    Marital status:      Spouse name: Not on file    Number of children: Not on file    Years of education: Not on file    Highest education level: Not on file   Occupational History    Not on file   Tobacco Use    Smoking status: Never    Smokeless tobacco: Never   Substance and Sexual Activity    Alcohol use: Yes     Alcohol/week: 11.0 standard drinks     Types: 5 Glasses of wine, 6 Cans of beer per week    Drug use: No    Sexual activity: Yes     Partners: Female     Birth control/protection: None   Other Topics Concern    Not on file   Social History Narrative    Not on file     Social Determinants of Health     Financial Resource Strain: Not on file   Food Insecurity: Not on file   Transportation Needs: Not on file   Physical Activity: Not on file   Stress: Not on file   Social Connections: Not on file   Intimate Partner Violence: Not on file   Housing Stability: Not on file         ALLERGIES: Celebrex [celecoxib] and Sulfa (sulfonamide antibiotics)    Review of Systems   Constitutional:  Negative for fever. HENT:  Positive for congestion and rhinorrhea. Negative for sore throat. Respiratory:  Positive for cough. Negative for shortness of breath. Cardiovascular:  Negative for chest pain. Gastrointestinal:  Positive for diarrhea and vomiting. Negative for abdominal pain. Genitourinary:  Negative for dysuria. Musculoskeletal:  Negative for arthralgias. Skin:  Negative for rash. Neurological:  Positive for light-headedness and headaches. +presyncope   Psychiatric/Behavioral:  Negative for confusion. Vitals:    03/21/23 1301   BP: 108/75   Pulse: (!) 108   Resp: 20   Temp: 98.2 °F (36.8 °C)   SpO2: 98%   Weight: 93 kg (205 lb)   Height: 6' 1\" (1.854 m)            Physical Exam  Vitals and nursing note reviewed. Constitutional:       General: He is not in acute distress. Appearance: Normal appearance. HENT:      Head: Normocephalic and atraumatic. Nose: Congestion and rhinorrhea present. Mouth/Throat:      Mouth: Mucous membranes are moist.   Eyes:      Conjunctiva/sclera: Conjunctivae normal.   Cardiovascular:      Rate and Rhythm: Normal rate and regular rhythm. Pulmonary:      Effort: Pulmonary effort is normal.      Breath sounds: Normal breath sounds. Abdominal:      General: Abdomen is flat. Bowel sounds are normal.      Palpations: Abdomen is soft. Tenderness: There is no abdominal tenderness. Musculoskeletal:         General: No signs of injury. Cervical back: Normal range of motion. Skin:     General: Skin is warm and dry. Findings: No rash. Neurological:      General: No focal deficit present. Mental Status: He is alert and oriented to person, place, and time. Psychiatric:         Mood and Affect: Mood normal.         Behavior: Behavior normal.        Medical Decision Making  78-year-old male with gastroenteritis type symptoms and postural presyncopal event after standing up. Work-up notable for mild DANIEL, overall reassuring electrolytes, mild leukocytosis, normal high-sensitivity troponin, normal BNP, chest x-ray without acute findings. Patient felt better after treatment with Zofran and IV fluids.   Patient has good access to healthcare resources with establish care with a cardiologist.  Signed out to Dr. Nikki Manzano at 4 PM pending UA, trial of ambulation. Suspect that presyncopal event was related to volume depletion. Anticipate likely discharge with outpatient follow-up, emergency department return precautions. Amount and/or Complexity of Data Reviewed  Labs: ordered. Radiology: ordered. ECG/medicine tests: ordered. Risk  Prescription drug management. Procedures      I personally reviewed and independently interpreted EKG, labs and imaging results. EKG Interpretation: EKG performed at 1502 shows rate of 83, QTc 439, normal sinus rhythm, normal axis, no STEMI. LABORATORY TESTS:  Admission on 03/21/2023   Component Date Value Ref Range Status    WBC 03/21/2023 12.3 (A)  4.1 - 11.1 K/uL Final    RBC 03/21/2023 5.22  4.10 - 5.70 M/uL Final    HGB 03/21/2023 15.9  12.1 - 17.0 g/dL Final    HCT 03/21/2023 48.7  36.6 - 50.3 % Final    MCV 03/21/2023 93.3  80.0 - 99.0 FL Final    MCH 03/21/2023 30.5  26.0 - 34.0 PG Final    MCHC 03/21/2023 32.6  30.0 - 36.5 g/dL Final    RDW 03/21/2023 12.4  11.5 - 14.5 % Final    PLATELET 77/07/9445 316  150 - 400 K/uL Final    MPV 03/21/2023 9.9  8.9 - 12.9 FL Final    NRBC 03/21/2023 0.0  0  WBC Final    ABSOLUTE NRBC 03/21/2023 0.00  0.00 - 0.01 K/uL Final    NEUTROPHILS 03/21/2023 80 (A)  32 - 75 % Final    LYMPHOCYTES 03/21/2023 10 (A)  12 - 49 % Final    MONOCYTES 03/21/2023 6  5 - 13 % Final    EOSINOPHILS 03/21/2023 2  0 - 7 % Final    BASOPHILS 03/21/2023 1  0 - 1 % Final    IMMATURE GRANULOCYTES 03/21/2023 1 (A)  0.0 - 0.5 % Final    ABS. NEUTROPHILS 03/21/2023 9.9 (A)  1.8 - 8.0 K/UL Final    ABS. LYMPHOCYTES 03/21/2023 1.2  0.8 - 3.5 K/UL Final    ABS. MONOCYTES 03/21/2023 0.8  0.0 - 1.0 K/UL Final    ABS. EOSINOPHILS 03/21/2023 0.2  0.0 - 0.4 K/UL Final    ABS. BASOPHILS 03/21/2023 0.1  0.0 - 0.1 K/UL Final    ABS. IMM.  GRANS. 03/21/2023 0.1 (A)  0.00 - 0.04 K/UL Final    DF 03/21/2023 AUTOMATED    Final    Sodium 03/21/2023 138  136 - 145 mmol/L Final    Potassium 03/21/2023 4.4 3.5 - 5.1 mmol/L Final    Chloride 03/21/2023 106  97 - 108 mmol/L Final    CO2 03/21/2023 28  21 - 32 mmol/L Final    Anion gap 03/21/2023 4 (A)  5 - 15 mmol/L Final    Glucose 03/21/2023 134 (A)  65 - 100 mg/dL Final    BUN 03/21/2023 22 (A)  6 - 20 MG/DL Final    Creatinine 03/21/2023 1.47 (A)  0.70 - 1.30 MG/DL Final    BUN/Creatinine ratio 03/21/2023 15  12 - 20   Final    eGFR 03/21/2023 51 (A)  >60 ml/min/1.73m2 Final    Comment:      Pediatric calculator link: Hyperpot.at. org/professionals/kdoqi/gfr_calculatorped       These results are not intended for use in patients <25years of age. eGFR results are calculated without a race factor using  the 2021 CKD-EPI equation. Careful clinical correlation is recommended, particularly when comparing to results calculated using previous equations. The CKD-EPI equation is less accurate in patients with extremes of muscle mass, extra-renal metabolism of creatinine, excessive creatine ingestion, or following therapy that affects renal tubular secretion. Calcium 03/21/2023 9.5  8.5 - 10.1 MG/DL Final    Bilirubin, total 03/21/2023 0.6  0.2 - 1.0 MG/DL Final    ALT (SGPT) 03/21/2023 35  12 - 78 U/L Final    AST (SGOT) 03/21/2023 20  15 - 37 U/L Final    Alk. phosphatase 03/21/2023 97  45 - 117 U/L Final    Protein, total 03/21/2023 7.9  6.4 - 8.2 g/dL Final    Albumin 03/21/2023 3.9  3.5 - 5.0 g/dL Final    Globulin 03/21/2023 4.0  2.0 - 4.0 g/dL Final    A-G Ratio 03/21/2023 1.0 (A)  1.1 - 2.2   Final    NT pro-BNP 03/21/2023 43  <125 PG/ML Final    Comment:      NT-proBNP is highly sensitive for the detection of acute congestive heart failure in dyspneic patients. A NT-proBNP result <300 pg/mL has a negative predictive value of 98% for acute heart failure.   Marked elevations in NT-proBNP levels may be observed in patients with left ventricular congestive failure, atrial fibrillation without clear heart failure, acute coronary syndromes, right heart strain/failure (including pulmonary embolism and cor pulmonale), critical illness, renal failure or advanced age. Falsely low NT-proBNP may be observed in obese CHF patients. Recent research suggests the following cutoff values are appropriate based on patient age and clinical setting, reduce false positive (FP) results, and improve positive predictive values for acute heart failure:  Acutely dyspneic patient: age <50, use cutoff of 450 pg/mL  Acutely dyspneic patient: age 54-65, use cutoff of 900 pg/mL  Acutely dyspneic patient: age                            >76, use cutoff of 1800 pg/mL       FDA approved cutpoints for ruling-out heart failure in the office:  Ambulatory patient: age <73, use cutoff of 125 pg/mL  Ambulatory patient: age >76, use cutoff of 450 pg/mL      Lipase 03/21/2023 164  73 - 393 U/L Final    Magnesium 03/21/2023 2.1  1.6 - 2.4 mg/dL Final    Phosphorus 03/21/2023 3.6  2.6 - 4.7 MG/DL Final    Ventricular Rate 03/21/2023 83  BPM Preliminary    Atrial Rate 03/21/2023 83  BPM Preliminary    P-R Interval 03/21/2023 184  ms Preliminary    QRS Duration 03/21/2023 76  ms Preliminary    Q-T Interval 03/21/2023 374  ms Preliminary    QTC Calculation (Bezet) 03/21/2023 439  ms Preliminary    Calculated P Axis 03/21/2023 91  degrees Preliminary    Calculated R Axis 03/21/2023 -23  degrees Preliminary    Calculated T Axis 03/21/2023 18  degrees Preliminary    Diagnosis 03/21/2023    Preliminary                    Value:Normal sinus rhythm  Normal ECG  When compared with ECG of 19-FEB-2015 08:35,  Nonspecific T wave abnormality now evident in Inferior leads      Troponin-High Sensitivity 03/21/2023 5  0 - 76 ng/L Final    Comment: A HS troponin value change of (+ or -) 50% or more below the 99th percentile, in a 1/2/3 hr interval represents a significant change. Clinical correlation is recommended.   A HS troponin value change of (+ or -) 20% or above the 99th percentile, in a 1/2/3 hr interval represents a significant change. Clinical correlation is recommended. 99th Percentile:   Women: 0-51 ng/L                                                                Men:   0-76 ng/L  Patients taking more than 20 mg/day of biotin may have falsely negative results and should not use this test.      SAMPLES BEING HELD 03/21/2023 1BLUE,1RED,1SST   Final    COMMENT 03/21/2023 Add-on orders for these samples will be processed based on acceptable specimen integrity and analyte stability, which may vary by analyte. Final       IMAGING RESULTS:  XR CHEST PORT   Final Result      No acute process. MEDICATIONS GIVEN:  Medications   lactated ringers bolus infusion 1,000 mL (1,000 mL IntraVENous New Bag 3/21/23 1407)   ondansetron (ZOFRAN) injection 4 mg (4 mg IntraVENous Given 3/21/23 1410)       IMPRESSION:  1. Gastroenteritis, acute    2. DANIEL (acute kidney injury) ([facility] [address]    3.  Postural dizziness with presyncope        PLAN:  - Discharge    Eriberto Gregory MD

## 2023-03-21 NOTE — ED TRIAGE NOTES
Reports diarrhea, that was several days ago and came back today. Several episodes of vomiting recently. Reports chills, no fever. Reports visual field changes and being too dizzy to get out of the car when he arrived. MD in triage.